# Patient Record
Sex: MALE | Race: WHITE
[De-identification: names, ages, dates, MRNs, and addresses within clinical notes are randomized per-mention and may not be internally consistent; named-entity substitution may affect disease eponyms.]

---

## 2017-01-19 ENCOUNTER — HOSPITAL ENCOUNTER (OUTPATIENT)
Dept: HOSPITAL 80 - BHCLAF | Age: 57
End: 2017-01-19
Attending: INTERNAL MEDICINE
Payer: COMMERCIAL

## 2017-01-19 DIAGNOSIS — E78.5: ICD-10-CM

## 2017-01-19 DIAGNOSIS — Q23.1: Primary | ICD-10-CM

## 2017-01-23 ENCOUNTER — HOSPITAL ENCOUNTER (OUTPATIENT)
Dept: HOSPITAL 80 - BHCLAF | Age: 57
End: 2017-01-23
Attending: INTERNAL MEDICINE
Payer: COMMERCIAL

## 2017-01-23 DIAGNOSIS — I35.9: Primary | ICD-10-CM

## 2017-01-23 DIAGNOSIS — I71.9: ICD-10-CM

## 2017-01-25 ENCOUNTER — HOSPITAL ENCOUNTER (OUTPATIENT)
Dept: HOSPITAL 80 - CIMAGING | Age: 57
End: 2017-01-25
Attending: THORACIC SURGERY (CARDIOTHORACIC VASCULAR SURGERY)
Payer: COMMERCIAL

## 2017-01-25 DIAGNOSIS — Q25.1: Primary | ICD-10-CM

## 2017-01-25 DIAGNOSIS — Z09: ICD-10-CM

## 2017-01-25 DIAGNOSIS — K76.89: ICD-10-CM

## 2017-01-25 DIAGNOSIS — Z86.79: ICD-10-CM

## 2017-01-25 DIAGNOSIS — J98.4: ICD-10-CM

## 2017-01-25 DIAGNOSIS — Q23.1: ICD-10-CM

## 2017-01-25 NOTE — CT
CT Chest Without Contrast   1111 hours



Indication:   Follow-up thoracic aortic aneurysm, congenital insufficiency of the aortic valve, coarc
tation of the aorta. (I 71.2, Q 23.1, Q 25.1).



Technique:    Spiral images were obtained through the chest.  Images were reviewed in multiple planes
. Dose reduction techniques were utilized. Volume rendering was attempted.



Comparison: Prior CT chest study from June 14, 2010..



Findings:    



Lung and large airways: Numerous small less than 3 mm calcifications are seen at the lung bases simil
ar to the prior study. This probably post infectious or related to underlying pneumoconiosis. There a
re no significant new pulmonary nodules identified. There is no consolidation or effusion..

Bronchi: No significant bronchial wall thickening 

Pleura:  Normal.

Vessels: There is once again underlying coarctation of the descending thoracic aorta with associated 
marginal arteriosclerotic calcifications. The left subclavian artery takeoff is just above the coarct
ation. There is moderate dilatation at the aortic root level measuring about 5.7 cm transversely by 4
.9 cm in the AP direction. Consider possibility of underlying bicuspid aortic valve..

Heart and pericardium:  Normal.

Mediastinum and aicha: No mass or lymphadenopathy.

Chest wall and lower neck:  Normal.

Limited upper abdomen: There are a few small subcentimeter low densities in the liver probably repres
enting small incidental cysts. No significant abnormalities visualized in the upper abdomen.



Skeletal system: Vertebral body heights are well-maintained. There are no lytic or sclerotic osseous 
lesions.



Impression:  

1. Relatively stable appearance of the thoracic coarctation involving the descending portion of the a
ortic arch just distal to the takeoff of the left subclavian artery as detailed above.

2. Moderate dilatation of the aortic root. Questionable bicuspid aortic valve.

3. Numerous small less than 3 mm calcifications at the lung bases similar to the prior study probably
 representing postinfectious etiology or pneumoconiosis.

4. Incidental subcentimeter probable cysts within the liver..

## 2017-02-22 ENCOUNTER — HOSPITAL ENCOUNTER (INPATIENT)
Dept: HOSPITAL 80 - F3N | Age: 57
LOS: 7 days | Discharge: HOME | DRG: 217 | End: 2017-03-01
Attending: INTERNAL MEDICINE | Admitting: THORACIC SURGERY (CARDIOTHORACIC VASCULAR SURGERY)
Payer: COMMERCIAL

## 2017-02-22 DIAGNOSIS — Q23.1: Primary | ICD-10-CM

## 2017-02-22 DIAGNOSIS — I25.10: ICD-10-CM

## 2017-02-22 DIAGNOSIS — I77.810: ICD-10-CM

## 2017-02-22 DIAGNOSIS — Q25.43: ICD-10-CM

## 2017-02-22 DIAGNOSIS — I48.1: ICD-10-CM

## 2017-02-22 DIAGNOSIS — J95.812: ICD-10-CM

## 2017-02-22 DIAGNOSIS — D62: ICD-10-CM

## 2017-02-22 DIAGNOSIS — Q25.1: ICD-10-CM

## 2017-02-22 DIAGNOSIS — E78.5: ICD-10-CM

## 2017-02-22 DIAGNOSIS — E66.9: ICD-10-CM

## 2017-02-22 DIAGNOSIS — I10: ICD-10-CM

## 2017-02-22 LAB
% IMMATURE GRANULYOCYTES: 0.5 % (ref 0–1.1)
ABSOLUTE IMMATURE GRANULOCYTES: 0.04 10^3/UL (ref 0–0.1)
ABSOLUTE NRBC COUNT: 0 10^3/UL (ref 0–0.01)
ADD DIFF?: NO
ADD MORPH?: NO
ADD SCAN?: NO
ANION GAP SERPL CALC-SCNC: 11 MEQ/L (ref 8–16)
ATYPICAL LYMPHOCYTE FLAG: 0 (ref 0–99)
CALCIUM SERPL-MCNC: 9.1 MG/DL (ref 8.5–10.4)
CHLORIDE SERPL-SCNC: 110 MEQ/L (ref 97–110)
CHOLEST SERPL-MCNC: 152 MG/DL (ref 140–220)
CHOLEST/HDLC SERPL: 4.47 RATIO (ref 1–4.97)
CO2 SERPL-SCNC: 23 MEQ/L (ref 22–31)
CREAT SERPL-MCNC: 0.8 MG/DL (ref 0.7–1.3)
ERYTHROCYTE [DISTWIDTH] IN BLOOD BY AUTOMATED COUNT: 13 % (ref 11.5–15.2)
EST. AVERAGE GLUCOSE BLD GHB EST-MCNC: 114 MG/DL (ref 68–126)
FRAGMENT RBC FLAG: 0 (ref 0–99)
GFR SERPL CREATININE-BSD FRML MDRD: > 60 ML/MIN/{1.73_M2}
GLUCOSE SERPL-MCNC: 87 MG/DL (ref 70–100)
HBA1C MFR BLD: 5.6 % (ref 4–6)
HCT VFR BLD CALC: 47.7 % (ref 40–51)
HGB BLD-MCNC: 16.4 G/DL (ref 13.7–17.5)
HIGH DENSITY LIPOPROTEIN: 34 MG/DL (ref 40–65)
INR PPP: 1.11 (ref 0.83–1.16)
LDLC/HDLC SERPL: 2.68 RATIO (ref 1–3.64)
LEFT SHIFT FLG: 0 (ref 0–99)
LIPEMIA HEMOLYSIS FLAG: 90 (ref 0–99)
LOW DENSITY LIPOPROTEIN: 91 MG/DL (ref 80–100)
MAGNESIUM SERPL-MCNC: 2.1 MG/DL (ref 1.6–2.3)
MCH RBC BLDCO QN: 31.4 PG (ref 27.9–34.1)
MCHC RBC AUTO-ENTMCNC: 34.4 G/DL (ref 32.4–36.7)
MCV RBC AUTO: 91.4 FL (ref 81.5–99.8)
NON-HIGH DENSITY LIPOPROTEIN: 118 MG/DL (ref 90–129)
NRBC-AUTO%: 0 % (ref 0–0.2)
PLATELET # BLD: 199 10^3/UL (ref 150–400)
PLATELET CLUMPS FLAG: 0 (ref 0–99)
PMV BLD AUTO: 9.1 FL (ref 8.7–11.7)
POTASSIUM SERPL-SCNC: 4 MEQ/L (ref 3.5–5.2)
PROTHROMBIN TIME: 14.2 SEC (ref 12–15)
RBC # BLD AUTO: 5.22 10^6/UL (ref 4.4–6.38)
SODIUM SERPL-SCNC: 144 MEQ/L (ref 134–144)
TRIGL SERPL-MCNC: 136 MG/DL (ref 40–150)
VERY LOW DENSITY LIPOPROTEINS: 27 MG/DL (ref 8–25)

## 2017-02-22 PROCEDURE — G0008 ADMIN INFLUENZA VIRUS VAC: HCPCS

## 2017-02-22 PROCEDURE — 4A023N7 MEASUREMENT OF CARDIAC SAMPLING AND PRESSURE, LEFT HEART, PERCUTANEOUS APPROACH: ICD-10-PCS | Performed by: INTERNAL MEDICINE

## 2017-02-22 PROCEDURE — P9041 ALBUMIN (HUMAN),5%, 50ML: HCPCS

## 2017-02-22 PROCEDURE — C1768 GRAFT, VASCULAR: HCPCS

## 2017-02-22 PROCEDURE — C1769 GUIDE WIRE: HCPCS

## 2017-02-22 RX ADMIN — MUPIROCIN SCH APP: 20 OINTMENT TOPICAL at 21:00

## 2017-02-22 NOTE — CPEKG
Heart Rate: 64

RR Interval: 938

P-R Interval: 180

QRSD Interval: 126

QT Interval: 420

QTC Interval: 434

P Axis: 30

QRS Axis: -41

T Wave Axis: 79

EKG Severity - ABNORMAL ECG -

EKG Impression: SINUS RHYTHM

EKG Impression: LEFT BUNDLE BRANCH BLOCK

Electronically Signed By: Shane Connell 22-Feb-2017 12:58:44

## 2017-02-22 NOTE — PDDXCAT
Diagnostic Cath Note





- .


Date: 02/22/17


: Yadiel


Indication: other (Aortic stenosis/regurgitation and thoracic aortic aneurysm)





- Procedure


Access: right wrist


Procedure: other (Sightseer and JR-4)





- Materials


Left Heart Cath size: 5F





- Findings-Left Heart Catheterization


LM: Normal.


LAD: Normal.


LCX: Large, dominant, and normal.


RCA: Small, non-dominant, and normal.


LVEF: No LV-gram.


Complications: None


Estimated blood loss: <50ml


Closure method: TR Band


Assessment: Angiographically normal, left-dominant coronary system.

## 2017-02-23 LAB
ANION GAP SERPL CALC-SCNC: 6 MEQ/L (ref 8–16)
CALCIUM SERPL-MCNC: 8.8 MG/DL (ref 8.5–10.4)
CALCULATED OXYGEN SATURATION: 95 % (ref 92–95)
CHLORIDE SERPL-SCNC: 110 MEQ/L (ref 97–110)
CO2 SERPL-SCNC: 24 MEQ/L (ref 22–31)
CREAT SERPL-MCNC: 0.8 MG/DL (ref 0.7–1.3)
GFR SERPL CREATININE-BSD FRML MDRD: > 60 ML/MIN/{1.73_M2}
GLUCOSE SERPL-MCNC: 87 MG/DL (ref 70–100)
POTASSIUM SERPL-SCNC: 4.3 MEQ/L (ref 3.5–5.2)
SAMPLE TYPE: (no result)
SODIUM SERPL-SCNC: 140 MEQ/L (ref 134–144)

## 2017-02-23 PROCEDURE — 02RX08Z REPLACEMENT OF THORACIC AORTA, ASCENDING/ARCH WITH ZOOPLASTIC TISSUE, OPEN APPROACH: ICD-10-PCS | Performed by: THORACIC SURGERY (CARDIOTHORACIC VASCULAR SURGERY)

## 2017-02-23 PROCEDURE — 5A1221Z PERFORMANCE OF CARDIAC OUTPUT, CONTINUOUS: ICD-10-PCS | Performed by: THORACIC SURGERY (CARDIOTHORACIC VASCULAR SURGERY)

## 2017-02-23 PROCEDURE — 02RF08Z REPLACEMENT OF AORTIC VALVE WITH ZOOPLASTIC TISSUE, OPEN APPROACH: ICD-10-PCS | Performed by: THORACIC SURGERY (CARDIOTHORACIC VASCULAR SURGERY)

## 2017-02-23 RX ADMIN — KETOROLAC TROMETHAMINE SCH MG: 15 INJECTION, SOLUTION INTRAMUSCULAR; INTRAVENOUS at 13:39

## 2017-02-23 RX ADMIN — POTASSIUM CHLORIDE PRN MLS: 400 INJECTION, SOLUTION INTRAVENOUS at 13:39

## 2017-02-23 RX ADMIN — POTASSIUM CHLORIDE PRN MLS: 400 INJECTION, SOLUTION INTRAVENOUS at 14:28

## 2017-02-23 RX ADMIN — KETOROLAC TROMETHAMINE SCH MG: 15 INJECTION, SOLUTION INTRAMUSCULAR; INTRAVENOUS at 18:03

## 2017-02-23 RX ADMIN — CEFAZOLIN SCH MLS: 1 INJECTION, POWDER, FOR SOLUTION INTRAMUSCULAR; INTRAVENOUS at 22:13

## 2017-02-23 RX ADMIN — CEFAZOLIN SCH MLS: 1 INJECTION, POWDER, FOR SOLUTION INTRAMUSCULAR; INTRAVENOUS at 14:23

## 2017-02-23 RX ADMIN — MUPIROCIN SCH: 20 OINTMENT TOPICAL at 13:33

## 2017-02-23 RX ADMIN — MUPIROCIN SCH APP: 20 OINTMENT TOPICAL at 21:13

## 2017-02-23 NOTE — GOP
DATE OF OPERATION:  02/23/2017



SURGEON:  Alonso Lyons DO



ASSISTANT:  Zion Collazo PA-C.



ANESTHESIA:  IV.  Anurag Oconnor MD.



PREOPERATIVE DIAGNOSIS:  Bicuspid aortic valve with aortic stenosis, aortic insufficiency, and aorti
c root aneurysm.



POSTOPERATIVE DIAGNOSIS:  Bicuspid aortic valve with aortic stenosis, aortic insufficiency, and aort
ic root aneurysm.



PROCEDURE PERFORMED:  

1.  Aortic root replacement with a #25 Freestyle bioprosthesis.

2.  Replaced ascending aorta with prosthetic material from the bioprosthesis.



FINDINGS:  Patient was noted to have a 5 cm aortic root aneurysm with bicuspid aortic valve, and a h
istory of previous coarctation.  He was at increased risk for dissection because of his pathology an
d was offered elective surgery.  He consented.



DESCRIPTION OF PROCEDURE:  He was brought to the operating room and intubated and monitoring lines w
ere placed.  He was prepped and draped in sterile classical manner.  Transesophageal echo revealed m
oderate AI with moderate AS, with obvious root aneurysm.  Coronaries were normal.  A sternotomy was 
performed.  He was heparinized and cannulated in the transverse arch and right atrium.  Retrograde a
nd antegrade cardioplegia catheters were placed.  Cardiopulmonary bypass was begun.  The aorta was c
ross clamped where it was of normal size at 2.5 cm at the innominate artery, and antegrade followed 
by retrograde cardioplegia were administered.  We placed an LV sump through the right superior pulmo
nary vein.  Topical hypothermia and systemic cooling were also utilized.  We then excised the ascend
ing aorta and the entire aortic root, which had a classic marfanoid characteristic to it with very t
hin friable sinuses, which were markedly dilated.  Both coronary ostia were markedly enlarged; they 
were excised as buttons.  The valve was excised.  Initially considered trying to repair; however, it
 was somewhat fibrosed and retracted at the margins, and I felt that he would just have too much aor
tic insufficiency despite repair.  This was a true bicuspid with coronaries 180 degrees apposed.  Th
e valve was excised.  Interrupted 2-0 Tycron sutures were placed.  He was sized for a 25 Freestyle v
alve, which was secured in place with Cor-Knot's with a piece of felt buttressing between the suture
s.  We then measured and excised the pigs non coronary sinus; the valve had been rotated 120 degrees
 in order to make the non coronary sinus the left coronary sinus of the conduit.  The left main was 
sutured in place with a continuous running 5-0 Prolene reinforced with BioGlue.  We then performed a
 similar button on the pig's left coronary sinus which became the right and sewed that with a contin
uous running 5-0 Prolene without tension, traction, or torsion.  Rewarming was begun while the end-t
o-end anastomosis was completed without tension.  CO2 had been infused throughout the procedure.  Th
e cross-clamp was removed with suction on the ascending aortic vent in Trendelenburg.  All air was a
spirated through the LV apex and ascending aortic vent, as well as intermittent aspiration of the LV
 apex with the sump off.  When no air was identified, he was weaned from bypass in Trendelenburg.  T
he heparin was reversed with protamine.  The cannula was removed and oversewn.  Two ventricular paci
ng wires and 2 mediastinal and 1 right pleural tubes were placed.  The thymic fat and pericardium we
re closed.  Chest was closed in a standard fashion.  Patient was returned to ICU in stable condition
.





Job #:  [id number]

## 2017-02-23 NOTE — POSTOPPROG
Post Op Note


Date of Operation: 02/23/17


Surgeon: Alonso Lyons


Assistant: Vale


Anesthesiologist: Lester


Anesthesia: GET(General Endotracheal)


Post-op Diagnosis: aortic root aneurysm, AS/AI


Procedure: aortic root #25 Freestyle, asc repalcement w conduit


Inf/Abcess present in the surg proc area at time of surgery?: No


EBL: 100-500


Complications: 





none


Drains: Other (3 blakes)

## 2017-02-23 NOTE — GCON
CRITICAL CARE CONSULT.



DATE OF CONSULTATION:  02/23/2017





HISTORY OF PRESENT ILLNESS:  The patient is a 56-year-old male with a history of aortic coarctation,
 who has had a repair in the distant past but developed aortic root dilatation with some aortic insu
fficiency.  He was taken to the operating room today for a repair by Dr. Lyons, which was without co
mplication.  He was returned to the ICU following surgery on a ventilator, but is weaning quite well
 and we anticipate extubation in the very near future.  His blood pressure was a little bit soft on 
a Cardene drip, which is currently being held.  Otherwise, his urine output is adequate, and the pat
ient is awake and cooperative.



REVIEW OF SYSTEMS:  Otherwise negative.



PAST MEDICAL HISTORY:  Includes the aortic coarctation, aortic dilatation, aortic insufficiency, hyp
ertension, cardiomyopathy and hyperlipidemia.



PAST SURGICAL HISTORY:  Includes a previous coarctation repair as well as this repair.



SOCIAL HISTORY:  He is a nonsmoker.  No alcohol or IV drug use.



FAMILY HISTORY:  Includes coronary disease and hypertension.



MEDICATIONS:  At this time, include Tylenol, aspirin, Lipitor, Dulcolax, Ancef, fentanyl, insulin, T
oradol, Reglan, Zofran, Protonix, senna.



PHYSICAL EXAMINATION:  VITAL SIGNS:  His blood pressure is currently 92/62 with a mean of 72, respir
atory rate is 14, heart rate 85, in sinus rhythm.  Oxygen saturation is 100% on minimal ventilator s
upport.  GENERAL:  He is an overweight male who was in no apparent distress.  Was somnolent but able
 to follow commands quite easily.  HEENT:  Pupils equally round, reactive to light, nonicteric and n
oninjected.  NECK:  Supple without adenopathy.  Breath sounds clear to auscultation.  HEART:  Regula
r rate and rhythm without obvious murmur.  CHEST:  His incision was clean and dry without evidence o
f bleeding.  Chest tube insertion sites were also clean and dry without bleeding or hematomas.  ABDO
MEN:  Soft, nontender, nondistended with hypoactive bowel tones.  EXTREMITIES:  No clubbing, cyanosi
s, or edema.  NEUROLOGICAL:  Cursory neurological exam is nonfocal.



OBJECTIVE DATA:  Includes basic metabolic panel, done earlier today, which was normal.  A chest x-ra
y showed lines and tube in the correct positions and minor atelectasis.



ASSESSMENT/PLAN:  

1.  Status post aortic root repair without major complications.  This seems to be hemodynamically st
able with a somewhat low blood pressure.  We are simply holding his Cardene drip at this time.  I do
 not believe any blood pressure support is required, but this may change in the very near future, de
pending on how well he responds.  He may need a NICOM to look for fluid responsiveness, and a CBC to
 be certain that no blood products are required.

2.  Respiratory failure with hypoxemia.  Seems to be doing well from a ventilator perspective and is
 likely to wean according to protocol.

3.  Hypertension.  As above, we are holding his medications at this time.





Job #:  [id number]

## 2017-02-24 LAB
% IMMATURE GRANULYOCYTES: 0.5 % (ref 0–1.1)
ABSOLUTE IMMATURE GRANULOCYTES: 0.07 10^3/UL (ref 0–0.1)
ABSOLUTE NRBC COUNT: 0 10^3/UL (ref 0–0.01)
ADD DIFF?: NO
ADD MORPH?: NO
ADD SCAN?: NO
ANION GAP SERPL CALC-SCNC: 8 MEQ/L (ref 8–16)
ATYPICAL LYMPHOCYTE FLAG: 0 (ref 0–99)
CALCIUM SERPL-MCNC: 8.2 MG/DL (ref 8.5–10.4)
CHLORIDE SERPL-SCNC: 114 MEQ/L (ref 97–110)
CO2 SERPL-SCNC: 22 MEQ/L (ref 22–31)
CREAT SERPL-MCNC: 0.7 MG/DL (ref 0.7–1.3)
ERYTHROCYTE [DISTWIDTH] IN BLOOD BY AUTOMATED COUNT: 13.2 % (ref 11.5–15.2)
FRAGMENT RBC FLAG: 0 (ref 0–99)
GFR SERPL CREATININE-BSD FRML MDRD: > 60 ML/MIN/{1.73_M2}
GLUCOSE SERPL-MCNC: 108 MG/DL (ref 70–100)
HCT VFR BLD CALC: 31.9 % (ref 40–51)
HGB BLD-MCNC: 10.9 G/DL (ref 13.7–17.5)
LEFT SHIFT FLG: 10 (ref 0–99)
LIPEMIA HEMOLYSIS FLAG: 90 (ref 0–99)
MCH RBC BLDCO QN: 31.7 PG (ref 27.9–34.1)
MCHC RBC AUTO-ENTMCNC: 34.2 G/DL (ref 32.4–36.7)
MCV RBC AUTO: 92.7 FL (ref 81.5–99.8)
NRBC-AUTO%: 0 % (ref 0–0.2)
PLATELET # BLD: 102 10^3/UL (ref 150–400)
PLATELET CLUMPS FLAG: 10 (ref 0–99)
PMV BLD AUTO: 9.7 FL (ref 8.7–11.7)
POTASSIUM SERPL-SCNC: 4.3 MEQ/L (ref 3.5–5.2)
RBC # BLD AUTO: 3.44 10^6/UL (ref 4.4–6.38)
SODIUM SERPL-SCNC: 144 MEQ/L (ref 134–144)

## 2017-02-24 RX ADMIN — HEPARIN SODIUM SCH UNIT: 5000 INJECTION, SOLUTION INTRAVENOUS; SUBCUTANEOUS at 05:50

## 2017-02-24 RX ADMIN — KETOROLAC TROMETHAMINE SCH MG: 15 INJECTION, SOLUTION INTRAMUSCULAR; INTRAVENOUS at 05:49

## 2017-02-24 RX ADMIN — CEFAZOLIN SCH MLS: 1 INJECTION, POWDER, FOR SOLUTION INTRAMUSCULAR; INTRAVENOUS at 23:21

## 2017-02-24 RX ADMIN — KETOROLAC TROMETHAMINE SCH MG: 15 INJECTION, SOLUTION INTRAMUSCULAR; INTRAVENOUS at 00:07

## 2017-02-24 RX ADMIN — CEFAZOLIN SCH MLS: 1 INJECTION, POWDER, FOR SOLUTION INTRAMUSCULAR; INTRAVENOUS at 14:33

## 2017-02-24 RX ADMIN — HEPARIN SODIUM SCH UNIT: 5000 INJECTION, SOLUTION INTRAVENOUS; SUBCUTANEOUS at 22:44

## 2017-02-24 RX ADMIN — METOPROLOL TARTRATE SCH MG: 25 TABLET, FILM COATED ORAL at 09:32

## 2017-02-24 RX ADMIN — KETOROLAC TROMETHAMINE SCH MG: 15 INJECTION, SOLUTION INTRAMUSCULAR; INTRAVENOUS at 18:00

## 2017-02-24 RX ADMIN — PANTOPRAZOLE SODIUM SCH MG: 40 TABLET, DELAYED RELEASE ORAL at 09:32

## 2017-02-24 RX ADMIN — HEPARIN SODIUM SCH UNIT: 5000 INJECTION, SOLUTION INTRAVENOUS; SUBCUTANEOUS at 14:33

## 2017-02-24 RX ADMIN — CEFAZOLIN SCH MLS: 1 INJECTION, POWDER, FOR SOLUTION INTRAMUSCULAR; INTRAVENOUS at 05:48

## 2017-02-24 RX ADMIN — ASPIRIN 81 MG SCH MG: 81 TABLET ORAL at 09:32

## 2017-02-24 RX ADMIN — METOPROLOL TARTRATE SCH MG: 25 TABLET, FILM COATED ORAL at 20:20

## 2017-02-24 RX ADMIN — MUPIROCIN SCH: 20 OINTMENT TOPICAL at 23:32

## 2017-02-24 RX ADMIN — MUPIROCIN SCH APP: 20 OINTMENT TOPICAL at 09:32

## 2017-02-24 RX ADMIN — DOCUSATE SODIUM AND SENNOSIDES SCH TAB: 50; 8.6 TABLET ORAL at 20:20

## 2017-02-24 RX ADMIN — KETOROLAC TROMETHAMINE SCH MG: 15 INJECTION, SOLUTION INTRAMUSCULAR; INTRAVENOUS at 12:00

## 2017-02-24 RX ADMIN — MUPIROCIN SCH APP: 20 OINTMENT TOPICAL at 23:22

## 2017-02-24 NOTE — SOAPPROG
SOAP Progress Note


Assessment/Plan: 





POD #1: Aortic root and partial ascending aortic replacement with #25 freestyle 

bioprosthetic porcine root 





BAV, dilated aortic root/ascending aorta, and severe AI with preserved LV s/p 

aortic root and partial ascending aortic replacement with #25 freestyle 

bioprosthetic graft


- AL/FC out 


- CT to remain on H2O seal d/t small air leak 


- Low dose BB for tachycardia / Lasix when BB improves  


- Transfer to PCU 





Acute blood loss anemia - stable





h/o aortic coarctation s/p repair as a child - stable  





02/24/17 10:13





Subjective: 





Feels well. No complaints.


Objective: 





 Vital Signs











Temp Pulse Resp BP Pulse Ox


 


 37.7 C   104 H  20   116/70   93 


 


 02/24/17 06:00  02/24/17 06:00  02/24/17 06:00  02/24/17 06:00  02/24/17 06:00








 Laboratory Results





 02/24/17 03:58 





 02/24/17 03:58 





 











 02/23/17 02/24/17 02/25/17





 05:59 05:59 05:59


 


Intake Total 1000 1698 


 


Output Total 750 2433 


 


Balance 250 -735 








 











PT  14.2 SEC (12.0-15.0)   02/22/17  11:50    


 


INR  1.11  (0.83-1.16)   02/22/17  11:50    














Physical Exam





- Physical Exam


General Appearance: WD/WN, alert, no apparent distress


EENT: No scleral icterus (R), No scleral icterus (L)


Neck: normal inspection


Respiratory: chest non-tender, lungs clear, normal breath sounds, No 

respiratory distress


Cardiac/Chest: tachycardia


Abdomen: normal bowel sounds, non-tender, soft


Skin: normal color, warm/dry


Extremities: pedal edema (+1 B/L)


Neuro/Psych: no motor/sensory deficits, alert, normal mood/affect, oriented x 3





ICD10 Worksheet


Patient Problems: 


 Problems











Problem Status Onset


 


Acute blood loss anemia Acute  


 


Coronary artery disease excluded Acute  ~02/22/17


 


S/P aortic valve replacement with stentless valve Acute  ~02/23/17


 


Aortic root aneurysm Chronic  


 


Aortic valve stenosis with insufficiency Chronic  


 


Bicuspid aortic valve Chronic

## 2017-02-25 LAB
% IMMATURE GRANULYOCYTES: 0.3 % (ref 0–1.1)
ABSOLUTE IMMATURE GRANULOCYTES: 0.04 10^3/UL (ref 0–0.1)
ABSOLUTE NRBC COUNT: 0 10^3/UL (ref 0–0.01)
ADD DIFF?: NO
ADD MORPH?: NO
ADD SCAN?: NO
ANION GAP SERPL CALC-SCNC: 7 MEQ/L (ref 8–16)
ATYPICAL LYMPHOCYTE FLAG: 0 (ref 0–99)
CALCIUM SERPL-MCNC: 8.2 MG/DL (ref 8.5–10.4)
CHLORIDE SERPL-SCNC: 111 MEQ/L (ref 97–110)
CO2 SERPL-SCNC: 25 MEQ/L (ref 22–31)
CREAT SERPL-MCNC: 0.9 MG/DL (ref 0.7–1.3)
ERYTHROCYTE [DISTWIDTH] IN BLOOD BY AUTOMATED COUNT: 13.8 % (ref 11.5–15.2)
FRAGMENT RBC FLAG: 0 (ref 0–99)
GFR SERPL CREATININE-BSD FRML MDRD: > 60 ML/MIN/{1.73_M2}
GLUCOSE SERPL-MCNC: 116 MG/DL (ref 70–100)
HCT VFR BLD CALC: 29.7 % (ref 40–51)
HGB BLD-MCNC: 10 G/DL (ref 13.7–17.5)
LEFT SHIFT FLG: 0 (ref 0–99)
LIPEMIA HEMOLYSIS FLAG: 80 (ref 0–99)
MCH RBC BLDCO QN: 31.8 PG (ref 27.9–34.1)
MCHC RBC AUTO-ENTMCNC: 33.7 G/DL (ref 32.4–36.7)
MCV RBC AUTO: 94.6 FL (ref 81.5–99.8)
NRBC-AUTO%: 0 % (ref 0–0.2)
PLATELET # BLD: 95 10^3/UL (ref 150–400)
PLATELET CLUMPS FLAG: 0 (ref 0–99)
PMV BLD AUTO: 10 FL (ref 8.7–11.7)
POTASSIUM SERPL-SCNC: 3.8 MEQ/L (ref 3.5–5.2)
RBC # BLD AUTO: 3.14 10^6/UL (ref 4.4–6.38)
SODIUM SERPL-SCNC: 143 MEQ/L (ref 134–144)

## 2017-02-25 RX ADMIN — METOPROLOL TARTRATE SCH MG: 25 TABLET, FILM COATED ORAL at 21:42

## 2017-02-25 RX ADMIN — DOCUSATE SODIUM AND SENNOSIDES SCH TAB: 50; 8.6 TABLET ORAL at 09:19

## 2017-02-25 RX ADMIN — HEPARIN SODIUM SCH UNIT: 5000 INJECTION, SOLUTION INTRAVENOUS; SUBCUTANEOUS at 06:46

## 2017-02-25 RX ADMIN — ATORVASTATIN CALCIUM SCH MG: 20 TABLET, FILM COATED ORAL at 21:42

## 2017-02-25 RX ADMIN — ASPIRIN 81 MG SCH MG: 81 TABLET ORAL at 09:19

## 2017-02-25 RX ADMIN — PANTOPRAZOLE SODIUM SCH MG: 40 TABLET, DELAYED RELEASE ORAL at 09:20

## 2017-02-25 RX ADMIN — METOPROLOL TARTRATE SCH MG: 25 TABLET, FILM COATED ORAL at 09:19

## 2017-02-25 RX ADMIN — DOCUSATE SODIUM AND SENNOSIDES SCH TAB: 50; 8.6 TABLET ORAL at 21:42

## 2017-02-25 RX ADMIN — MUPIROCIN SCH APP: 20 OINTMENT TOPICAL at 09:21

## 2017-02-25 NOTE — SOAPPROG
SOAP Progress Note


Assessment/Plan: 


Assessment:  POD#2 AVR with root and partial ascending aortic replacement using 

a #25 freestyle porcine root 





BAV w AS/AI and dilated ascending aorta - s/p rsxn and replacement with porcine 

root. Sufficient length to reach distal aorta without dacron interposition 

graft. Antithrombotic prophylaxis with ASA alone pending stability of rhythm. 





Postoperative tachycardia - with borderline hypotension. Likely reactive to 

pain or fluid losses. No evidence bleeding or renal insuff.  Cards consulted to 

opine ST vs AT. Trial of adenosine recommended. Care with diuresis. BB for AF 

prophylaxis when appropriate.





Postoperative air leak - with valsalva. No assoc PTX. Apical pleural nick 

suspected. Chest tubes maintained to pleurovac. 





Acute expected blood loss anemia - Stable. No transfusions required. 











Plan:


Adenosine 6 mg IVP, MR x 1 prn.


Gentle hydration w IVF.


Metoprolol 12.5 mg BID w conservative hold parameters.


Keep chest tubes to pleurovac.


Keep Vwires.


Inc activity as tolerated.








02/25/17 10:13

















Subjective: 





Doing ok. Satisfactory analgesia. No nausea or dizziness. No acute c/o.


Objective: 





 Vital Signs











Temp Pulse Resp BP Pulse Ox


 


 36.4 C   133 H  18   111/66   92 


 


 02/25/17 07:00  02/25/17 08:44  02/25/17 07:00  02/25/17 07:00  02/25/17 08:44








 Laboratory Results





 02/25/17 03:40 





 02/25/17 03:40 





 











 02/24/17 02/25/17 02/26/17





 05:59 05:59 05:59


 


Intake Total 1698 400 


 


Output Total 2433 1185 


 


Balance -735 -785 








 











PT  14.2 SEC (12.0-15.0)   02/22/17  11:50    


 


INR  1.11  (0.83-1.16)   02/22/17  11:50    








Narrow complex tachycardia 100s-130s. Well tolerated. SBP > 90.


Fairly vigorous autodiuresis yest with stable lytes and renal fx.


CXR-> mild pulm vasc congestion, small rt pl eff, no overt PTX.








Physical Exam





- Physical Exam


General Appearance: alert


Respiratory: lungs clear (grossly), other (blakes x 3 y-d to pleurovac, 

serosang drainage, no air leak w nl tidal, +1 chamber leak w cough)


Cardiac/Chest: regular rate, rhythm, tachycardia, other (Sternum grossly 

stable. Sternotomy CDI)


Abdomen: normal bowel sounds, non-tender, soft


Skin: warm/dry


Extremities: other (no visible edema)





ICD10 Worksheet


Patient Problems: 


 Problems











Problem Status Onset


 


Acute blood loss anemia Acute  


 


Coronary artery disease excluded Acute  ~02/22/17


 


S/P aortic valve replacement with stentless valve Acute  ~02/23/17


 


S/P ascending aortic replacement Acute  ~02/23/17


 


Aortic root aneurysm Chronic  


 


Aortic valve stenosis with insufficiency Chronic  


 


Bicuspid aortic valve Chronic

## 2017-02-25 NOTE — CPEKG
Heart Rate: 116

RR Interval: 517

P-R Interval: 168

QRSD Interval: 120

QT Interval: 340

QTC Interval: 473

P Axis: 41

QRS Axis: 0

T Wave Axis: 87

EKG Severity - ABNORMAL ECG -

EKG Impression: SINUS TACHYCARDIA

EKG Impression: NONSPECIFIC INTRAVENTRICULAR CONDUCTION DELAY

EKG Impression: BORDERLINE R WAVE PROGRESSION, ANTERIOR LEADS

Electronically Signed By: Shane Connell 25-Feb-2017 12:53:38

## 2017-02-26 LAB
ANION GAP SERPL CALC-SCNC: 4 MEQ/L (ref 8–16)
CALCIUM SERPL-MCNC: 8 MG/DL (ref 8.5–10.4)
CHLORIDE SERPL-SCNC: 108 MEQ/L (ref 97–110)
CO2 SERPL-SCNC: 26 MEQ/L (ref 22–31)
CREAT SERPL-MCNC: 0.6 MG/DL (ref 0.7–1.3)
GFR SERPL CREATININE-BSD FRML MDRD: > 60 ML/MIN/{1.73_M2}
GLUCOSE SERPL-MCNC: 107 MG/DL (ref 70–100)
POTASSIUM SERPL-SCNC: 4.1 MEQ/L (ref 3.5–5.2)
SODIUM SERPL-SCNC: 138 MEQ/L (ref 134–144)

## 2017-02-26 RX ADMIN — METOPROLOL TARTRATE SCH MG: 25 TABLET, FILM COATED ORAL at 08:25

## 2017-02-26 RX ADMIN — ATORVASTATIN CALCIUM SCH MG: 20 TABLET, FILM COATED ORAL at 21:51

## 2017-02-26 RX ADMIN — METOPROLOL TARTRATE SCH MG: 25 TABLET, FILM COATED ORAL at 21:52

## 2017-02-26 RX ADMIN — PANTOPRAZOLE SODIUM SCH MG: 40 TABLET, DELAYED RELEASE ORAL at 08:28

## 2017-02-26 RX ADMIN — DOCUSATE SODIUM AND SENNOSIDES SCH: 50; 8.6 TABLET ORAL at 21:53

## 2017-02-26 RX ADMIN — ASPIRIN 81 MG SCH MG: 81 TABLET ORAL at 08:25

## 2017-02-26 RX ADMIN — DOCUSATE SODIUM AND SENNOSIDES SCH TAB: 50; 8.6 TABLET ORAL at 08:26

## 2017-02-26 NOTE — SOAPPROG
SOAP Progress Note


Assessment/Plan: 


Assessment:  POD#3 AVR with root and partial ascending aortic replacement using 

a #25 freestyle porcine root 





BAV w AS/AI and dilated ascending aorta - s/p rsxn and replacement with porcine 

root. Sufficient length to reach distal aorta without dacron interposition 

graft. Antithrombotic prophylaxis with ASA alone pending stability of rhythm. 





Postoperative tachycardia - with borderline hypotension. Likely reactive to 

pain or fluid losses. No evidence bleeding or renal insuff.  Cards consulted to 

opine ST vs AT. Trial of adenosine recommended. Care with diuresis. BB for AF 

prophylaxis when appropriate.





Postoperative air leak - with valsalva. No assoc PTX. Apical pleural nick 

suspected. Chest tubes maintained to pleurovac with resolution of leak.





Acute expected blood loss anemia - Stable. No transfusions required. 











Plan:


Inc Metoprolol to 25mg BID.


Blakes to bulb suction.


Vwire removed.


Inc activity as tolerated.


Baseline postop echo tomorrow.











02/26/17 09:01














Subjective: 





Feels fine. Eating well. Improving stamina. Satisfactory analgesia. No acute 

concerns. 


Objective: 





 Vital Signs











Temp Pulse Resp BP Pulse Ox


 


 36.7 C   109 H  29 H  106/76   96 


 


 02/26/17 12:21  02/26/17 12:21  02/26/17 12:21  02/26/17 12:21  02/26/17 12:21








 Laboratory Results





 02/26/17 05:40 





 02/26/17 05:40 





 











 02/25/17 02/26/17 02/27/17





 05:59 05:59 05:59


 


Intake Total 400 2238 520


 


Output Total 1185 1370 100


 


Balance -785 868 420








 











PT  14.2 SEC (12.0-15.0)   02/22/17  11:50    


 


INR  1.11  (0.83-1.16)   02/22/17  11:50    








Persistent ST/AT. Adequate BP for BB.


Decr suppl O2 req.


CXR-> No PTX, bibasilar atelectasis.


Balanced I/OS.


Stable labs.





Physical Exam





- Physical Exam


General Appearance: alert, no apparent distress


Respiratory: lungs clear, other (blakes x 3 y-d to pleurovac, serosang drainage

, no AL w cough.)


Cardiac/Chest: regular rate, rhythm, tachycardia, other (Vwires intact)


Abdomen: non-tender, soft


Skin: warm/dry


Extremities: swelling (trace dependent)





ICD10 Worksheet


Patient Problems: 


 Problems











Problem Status Onset


 


Acute blood loss anemia Acute  


 


Coronary artery disease excluded Acute  ~02/22/17


 


S/P aortic valve replacement with stentless valve Acute  ~02/23/17


 


S/P ascending aortic replacement Acute  ~02/23/17


 


Aortic root aneurysm Chronic  


 


Aortic valve stenosis with insufficiency Chronic  


 


Bicuspid aortic valve Chronic

## 2017-02-27 LAB
ERYTHROCYTE [DISTWIDTH] IN BLOOD BY AUTOMATED COUNT: 13.2 % (ref 11.5–15.2)
HCT VFR BLD CALC: 29 % (ref 40–51)
HGB BLD-MCNC: 9.7 G/DL (ref 13.7–17.5)
LIPEMIA HEMOLYSIS FLAG: 80 (ref 0–99)
MCH RBC BLDCO QN: 31.6 PG (ref 27.9–34.1)
MCHC RBC AUTO-ENTMCNC: 33.4 G/DL (ref 32.4–36.7)
MCV RBC AUTO: 94.5 FL (ref 81.5–99.8)
PLATELET # BLD: 110 10^3/UL (ref 150–400)
PLATELET CLUMPS FLAG: 10 (ref 0–99)
POTASSIUM SERPL-SCNC: 3.9 MEQ/L (ref 3.5–5.2)
RBC # BLD AUTO: 3.07 10^6/UL (ref 4.4–6.38)

## 2017-02-27 RX ADMIN — POTASSIUM CHLORIDE SCH MEQ: 1500 TABLET, EXTENDED RELEASE ORAL at 09:30

## 2017-02-27 RX ADMIN — METOPROLOL TARTRATE SCH MG: 25 TABLET, FILM COATED ORAL at 19:53

## 2017-02-27 RX ADMIN — ATORVASTATIN CALCIUM SCH MG: 20 TABLET, FILM COATED ORAL at 19:53

## 2017-02-27 RX ADMIN — ASPIRIN 81 MG SCH MG: 81 TABLET ORAL at 09:30

## 2017-02-27 RX ADMIN — METOPROLOL TARTRATE SCH MG: 25 TABLET, FILM COATED ORAL at 09:30

## 2017-02-27 RX ADMIN — PANTOPRAZOLE SODIUM SCH MG: 40 TABLET, DELAYED RELEASE ORAL at 09:30

## 2017-02-27 RX ADMIN — DOCUSATE SODIUM AND SENNOSIDES SCH TAB: 50; 8.6 TABLET ORAL at 09:29

## 2017-02-27 RX ADMIN — FUROSEMIDE SCH MG: 20 TABLET ORAL at 09:30

## 2017-02-27 NOTE — ECHO
[id number]

[id number]



+---------------------------------------------------+      [address]

:                                                   :       [place] [address]

:                                                   :           [phone]

+---------------------------------------------------+       Fax [phone]



                       Adult Echocardiographic Report



+----------------------------------------------------------------------------

---+

:Name: LILO GRAY CStudy Date: 2017 10:06 AM                          

   :

:MRN: [id number]    Hospital Admission Number: [id number] Location: 

220:

:: 1960    Gender: Male                           Height: 73 in     

   :

:Age: 56 yrs        Race: WH                               Weight: 234 lb    

   :

:Reason For Study: S/P AVR/root #23 medtronic freestyle                      

   :

:porcine root                                              BSA: 2.3 meters2  

   :

:History: Coarc repair age 10                                                

   :

+----------------------------------------------------------------------------

---+

Doppler Measurements \T\ Calculations

MV E max zari: 111.1 cm/sec              Ao mean P.5 mmHg

MV A max zari: 86.4 cm/sec               Ao V2 mean: 116.2 cm/sec

MV E/A: 1.3                             Ao V2 VTI: 29.6 cm





Left Ventricle

The left ventricle is normal in size. There is normal left ventricular wall

thickness. EF estimate is 55-60%. Left ventricular systolic function is

normal.





Right Ventricle

The right ventricle is normal in size and function.



Atria

The left atrial size is normal. Right atrial size is normal. The interatrial

septum is intact with no evidence for an atrial septal defect.



Mitral Valve

The mitral valve is normal in structure and function. There is no evidence

of mitral valve prolapse. There is no mitral valve stenosis. There is trace

to mild mitral regurgitation.



Tricuspid Valve

The tricuspid valve is not well visualized.





Aortic Valve

There is a bioprosthetic aortic valve. AV max PG is 13mmHG. AV mean PG is

6mmHG.

AV not well visualized.



Pulmonic Valve

The pulmonic valve is not well visualized. There is no pulmonic valvular

regurgitation.



Great Vessels

The aortic root is not well visualized.



Pericardium/Pleural

There is no pericardial effusion.



Conclusion

A complete two-dimensional transthoracic echocardiogram was performed (2D,

M-mode, Doppler and color flow Doppler).

The study was technically difficult.

EF estimate is 55-60%.

Left ventricular systolic function is normal.

There is trace to mild mitral regurgitation.

There is a bioprosthetic aortic valve.

AV max PG is 13mmHG. AV mean PG is 6mmHG.

AV not well visualized.

The aortic root is not well visualized.

There is no pericardial effusion.





_____________________________________________________________________________





Final Reading Physician:

                        Harrison Meza signed on 2017 12:12 PM

Ordering Physician: Camille Neal

Performed By: Radha Miranda RDCS

## 2017-02-27 NOTE — SOAPPROG
SOAP Progress Note


Assessment/Plan: 


Assessment:  POD#4 AVR with root and partial ascending aortic replacement using 

a #25 freestyle porcine root 





BAV w AS/AI and dilated ascending aorta - s/p rsxn and replacement with porcine 

root. Sufficient length to reach distal aorta without dacron interposition 

graft. Antithrombotic prophylaxis with ASA alone pending stability of rhythm. 





Postoperative tachycardia - with borderline hypotension. Likely reactive to 

pain or fluid losses. No evidence bleeding or renal insuff.  Cards consulted to 

opine ST vs AT. Trial of adenosine able to transiently slow rate to 70s. No 

definitive change in PW. Active diuresis delayed. Slow uptitration of BB.





Postoperative air leak - Resolved. Chest tubes likely out today or tomorrow.





Acute expected blood loss anemia - Stable. No transfusions required. 











Plan:


Cont Metoprolol 25mg BID.


Begin daily diuresis.


Remove mediastinal and left pleural julia later today.


Remove rt pleural julia tomorrow.


Cont inc activity and pulm toilet.


Dispo - Anticipate home on Wed.





02/27/17 09:42














Subjective: 





Feels fine. Tolerating light activity without difficulty. Minimal incisional 

discomfort. +BM. Hopeful for home soon.


Objective: 





 Vital Signs











Temp Pulse Resp BP Pulse Ox


 


 37.0 C   104 H  20   111/75   93 


 


 02/27/17 08:05  02/27/17 08:05  02/27/17 08:05  02/27/17 08:05  02/27/17 08:05








 Laboratory Results





 02/27/17 05:00 





 02/27/17 05:00 





 











 02/26/17 02/27/17 02/28/17





 05:59 05:59 05:59


 


Intake Total 2238 1720 120


 


Output Total 1370 1830 


 


Balance 868 -110 120








 











PT  14.2 SEC (12.0-15.0)   02/22/17  11:50    


 


INR  1.11  (0.83-1.16)   02/22/17  11:50    








HR remains ST/AT 90s-100s. Metoprolol inc to 25mg BID last noc.


Echo pending.


Decr suppl O2 needs.


Improving fluid balance.


2 of 3 blakes at removal criteria. 


Plt rebound evident.





Physical Exam





- Physical Exam


General Appearance: alert, no apparent distress


Respiratory: lungs clear, other (blakes x 3 to bulb suction, thin serosang 

drainage, rt pl tube draining the most)


Cardiac/Chest: regular rate, rhythm, tachycardia, other (Sternum grossly 

stable. Sternotomy CDI)


Abdomen: non-tender, soft


Skin: warm/dry


Extremities: swelling (trace)





ICD10 Worksheet


Patient Problems: 


 Problems











Problem Status Onset


 


Acute blood loss anemia Acute  


 


Coronary artery disease excluded Acute  ~02/22/17


 


S/P aortic valve replacement with stentless valve Acute  ~02/23/17


 


S/P ascending aortic replacement Acute  ~02/23/17


 


Aortic root aneurysm Chronic  


 


Aortic valve stenosis with insufficiency Chronic  


 


Bicuspid aortic valve Chronic

## 2017-02-28 LAB — POTASSIUM SERPL-SCNC: 3.9 MEQ/L (ref 3.5–5.2)

## 2017-02-28 RX ADMIN — AMIODARONE HYDROCHLORIDE SCH MG: 200 TABLET ORAL at 20:53

## 2017-02-28 RX ADMIN — POTASSIUM CHLORIDE SCH MEQ: 1500 TABLET, EXTENDED RELEASE ORAL at 07:54

## 2017-02-28 RX ADMIN — METOPROLOL TARTRATE SCH MG: 25 TABLET, FILM COATED ORAL at 07:54

## 2017-02-28 RX ADMIN — APIXABAN SCH MG: 2.5 TABLET, FILM COATED ORAL at 20:53

## 2017-02-28 RX ADMIN — METOPROLOL TARTRATE SCH MG: 25 TABLET, FILM COATED ORAL at 20:53

## 2017-02-28 RX ADMIN — ASPIRIN 81 MG SCH MG: 81 TABLET ORAL at 07:53

## 2017-02-28 RX ADMIN — ATORVASTATIN CALCIUM SCH MG: 20 TABLET, FILM COATED ORAL at 20:53

## 2017-02-28 RX ADMIN — APIXABAN SCH MG: 2.5 TABLET, FILM COATED ORAL at 10:18

## 2017-02-28 RX ADMIN — AMIODARONE HYDROCHLORIDE SCH MG: 200 TABLET ORAL at 10:18

## 2017-02-28 RX ADMIN — FUROSEMIDE SCH MG: 20 TABLET ORAL at 07:53

## 2017-02-28 NOTE — SOAPPROG
SOAP Progress Note


Assessment/Plan: 





Assessment:  POD#5 AVR with root and partial ascending aortic replacement using 

a #25 freestyle porcine root 





BAV w AS/AI and dilated ascending aorta - s/p rsxn and replacement with porcine 

root. Sufficient length to reach distal aorta without dacron interposition 

graft. 





Postoperative PAF - continue BB/Amio/Eliquis 





Postoperative air leak - Resolved. Chest tubes d/c'd this AM





Acute expected blood loss anemia - Stable. No transfusions required. 





Disposition - Home 3/1 without services





Subjective: 





Feels well. Ready to go home.


Objective: 





 Vital Signs











Temp Pulse Resp BP Pulse Ox


 


 36.8 C   102 H  16   111/69   91 L


 


 02/28/17 07:58  02/28/17 07:54  02/28/17 07:58  02/28/17 07:54  02/28/17 07:58








 Laboratory Results





 02/27/17 05:00 





 02/28/17 05:30 





 











 02/27/17 02/28/17 03/01/17





 05:59 05:59 05:59


 


Intake Total 1720 880 


 


Output Total 1830 2085 90


 


Balance -110 -1205 -90








 











PT  14.2 SEC (12.0-15.0)   02/22/17  11:50    


 


INR  1.11  (0.83-1.16)   02/22/17  11:50    














Physical Exam





- Physical Exam


General Appearance: WD/WN, alert, no apparent distress, obese


EENT: No scleral icterus (R), No scleral icterus (L)


Neck: normal inspection


Respiratory: lungs clear, No respiratory distress


Cardiac/Chest: regular rate, rhythm


Abdomen: non-tender, soft, distended


Skin: normal color, warm/dry


Extremities: No pedal edema


Neuro/Psych: no motor/sensory deficits, alert, normal mood/affect, oriented x 3





ICD10 Worksheet


Patient Problems: 


 Problems











Problem Status Onset


 


Acute blood loss anemia Acute  


 


Coronary artery disease excluded Acute  ~02/22/17


 


S/P aortic valve replacement with stentless valve Acute  ~02/23/17


 


S/P ascending aortic replacement Acute  ~02/23/17


 


Aortic root aneurysm Chronic  


 


Aortic valve stenosis with insufficiency Chronic  


 


Bicuspid aortic valve Chronic

## 2017-03-01 VITALS — TEMPERATURE: 98.6 F | OXYGEN SATURATION: 91 %

## 2017-03-01 VITALS — HEART RATE: 105 BPM | DIASTOLIC BLOOD PRESSURE: 62 MMHG | SYSTOLIC BLOOD PRESSURE: 104 MMHG | RESPIRATION RATE: 18 BRPM

## 2017-03-01 LAB — POTASSIUM SERPL-SCNC: 4.1 MEQ/L (ref 3.5–5.2)

## 2017-03-01 RX ADMIN — ASPIRIN 81 MG SCH MG: 81 TABLET ORAL at 08:56

## 2017-03-01 RX ADMIN — POTASSIUM CHLORIDE SCH MEQ: 1500 TABLET, EXTENDED RELEASE ORAL at 08:55

## 2017-03-01 RX ADMIN — METOPROLOL TARTRATE SCH MG: 25 TABLET, FILM COATED ORAL at 08:56

## 2017-03-01 RX ADMIN — AMIODARONE HYDROCHLORIDE SCH MG: 200 TABLET ORAL at 08:56

## 2017-03-01 RX ADMIN — FUROSEMIDE SCH MG: 20 TABLET ORAL at 08:56

## 2017-03-01 RX ADMIN — APIXABAN SCH MG: 2.5 TABLET, FILM COATED ORAL at 08:56

## 2017-03-01 NOTE — SOAPPROG
SOAP Progress Note


Assessment/Plan: 


Assessment:  POD#6 AVR with root and partial ascending aortic replacement using 

a #25 freestyle porcine root 





BAV w AS/AI and dilated ascending aorta - s/p rsxn and replacement with porcine 

root. Sufficient length to reach distal aorta without dacron interposition 

graft. Antithrombotic prophylaxis as per rhythm.





Postoperative PAF - Responsive to amiodarone. Adjunctive rate control with BB. 

Eliquis added for expected recurrence. [id number] score of 1.





Postoperative air leak - Resolved. Chest tubes out yesterday.





Acute expected blood loss anemia - Stable. No transfusions required. 











Plan:


Home today.


Instructions re diet, meds, activity, f/u and wound care to be reviewed in 

presence of sister.








03/01/17 07:07











Subjective: 





Feels great. No acute concerns. Looking forward to shower and home.


Objective: 





 Vital Signs











Temp Pulse Resp BP Pulse Ox


 


 36.6 C   108 H  20   128/77 H  92 


 


 03/01/17 04:00  03/01/17 04:00  03/01/17 04:00  03/01/17 04:00  03/01/17 04:00








 Laboratory Results





 02/27/17 05:00 





 03/01/17 05:30 





 











 02/28/17 03/01/17 03/02/17





 05:59 05:59 05:59


 


Intake Total 880 600 


 


Output Total 2085 490 


 


Balance -1205 110 








 











PT  14.2 SEC (12.0-15.0)   02/22/17  11:50    


 


INR  1.11  (0.83-1.16)   02/22/17  11:50    








Persistent ST/AT (generally 90s). No AF. Insufficient BP for further 

uptitration BB.


Off O2. 1 kg above preop wt.


Ongoing diuresis. K stable.


CXR->mild bibasilar atelectasis.

















Physical Exam





- Physical Exam


General Appearance: alert, no apparent distress


Respiratory: lungs clear


Cardiac/Chest: regular rate, rhythm, tachycardia, other (Sternum grossly 

stable. Sternotomy and CT sites ok.)


Abdomen: normal bowel sounds, non-tender, soft


Skin: warm/dry


Extremities: swelling (trace)





ICD10 Worksheet


Patient Problems: 


 Problems











Problem Status Onset


 


Acute blood loss anemia Acute  


 


Coronary artery disease excluded Acute  ~02/22/17


 


S/P aortic valve replacement with stentless valve Acute  ~02/23/17


 


S/P ascending aortic replacement Acute  ~02/23/17


 


Aortic root aneurysm Chronic  


 


Aortic valve stenosis with insufficiency Chronic  


 


Bicuspid aortic valve Chronic

## 2017-03-01 NOTE — PDDCSUM
Discharge Summary


Discharge Summary: 





DATE OF ADMISSION: 17





DATE OF DISCHARGE: 3/1/17





DISPOSITION: Home, self-care.





PRINCIPAL ADMISSION DIAGNOSES: 


1. Bicuspid aortic valve with mild stenosis and moderate-severe insufficiency.


2. Enlarging aortic root aneurysm.





PRINCIPAL DISCHARGE DIAGNOSES:


1. Coronary artery disease excluded.


2. Carotid artery disease excluded.


3. Status post aortic valve and root replacement with a porcine root.


4. Acute expected blood loss anemia.


5. Postoperative atrial tachycardia with paroxysmal atrial fibrillation.








HISTORY OF PRESENT ILLNESS:


57 yo sedentary male, followed for BAV and dilated ascending aorta, evaluated 

for onset of NYHA class II symptoms and referred for surgical repair after 

discovered to have enlargement of aortic root to 5.7 cm along with moderate AI 

and mild LVDD. No LVE, LVSD, presyncope, or documented atrial fibrillation. 

Admitted in advance of valvular and aortic resection to complete risk 

stratification.





PERTINENT PAST MEDICAL HISTORY:


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 Assoc with 

knee joint abnormalities, webbed feet, and developmental delays. On disability. 

Lives with sister and mother.


HTN, benign.


Dyslipidemia.


Palpitations - PSVT by remote Holter monitoring. Controlled with CCB.


Obesity.





MEDICATIONS ON ADMISSION:


Valsartan 80 mg daily, Diltiazem 30 mg BID, ASA 81 mg daily, Lipitor 20 mg hs





ALLERGIES: NKDA





CONSULTANT:


Critical care/pulmonology (Herman)





PROCEDURES/IMAGIN/22 (Yadiel): Left heart catheterization with selective coronary 

angiography. Access via right radial artery.


 Carotid ultrasound


 (Serena): Aortic valve and root replacement with a 25 mm [employer] 

freestyle porcine root. Reimplantation of native left and right coronary 

arteries into the graft.


 (Robbie): Transthoracic echocardiogram.





ABBREVIATED HOSPITAL COURSE:


Preop imaging negative for coronary or carotid anomaly or disease and taken to 

the OR on hospital day #2. The aortic valve and aneurysmal aorta were resected 

and replaced with a porcine root. Pathology later returned confirming myxoid 

degenerative changes. The proximal aorta was able to be reached without an 

interposition graft. The procedure was well tolerated and without apparent 

complications. Easily  from CPB with preserved BiV systolic fx and 

normal bioprosthetic valve fx. Transferred to the ICU in hemodynamically stable 

condition. No prolonged vasoactive support, blood, blood products or backup 

pacing required. Postop course notable for persistent atrial tachycardia 

refractory to adenosine and low dose beta blocker. Bursts of AF/Fl apparent by 

POD#4 despite escalating metoprolol dosing and amiodarone added. Eliquis also 

started since LA appendage not ligated and recurrent SVT anticipated. Improved 

resting rate control noted by POD#6 and felt to be stable for discharge home 

under family's care. Preferential use of beta blockers next 3 months for 

adjunctive reduction in wall tension of resected aorta.





DISCHARGE CLINICAL INFORMATION:


Sternum grossly stable. Sternotomy CDI, sutured, +Dermabond.


HR 90s-100s. SBP 100s- 110s. SpO2 92% room air. Wt 1.3 kg above admission at 

101 kilos.


Hgb 9.7, HCT 29, Plt 110, Na 13, K 4.1, BUN 17, Cr 0.6.


CXR: Mild interstitial edema and right basilar atelectasis. 





DISCHARGE MEDICATIONS:


As on admission with the following adjustments:


1. Valsartan held.


2. Diltiazem held.


NEW prescriptions:


1. Amiodarone 200 mg BID x 1 month or as directed by cardiology.


2. Metoprolol 25 mg 1.5 tabs (37.5 mg) BID or as directed.


3. Lasix 20 mg 2 tabs (40 mg) daily until back to baseline weight and no 

swelling.


4. KlorCon 20mEQ daily with lasix.


5. Eliquis 2.5 mg BID x 1 month or as directed by cardiology.


6. Tramadol 50 mg q 6-8 hrs prn incisional discomfort.





FOLLOW UP APPOINTMENTS:


1.  CV Surgery: with Dr Lyons at [facility] on 


2.  Cardiology: with Dr Markham at [facility] in 4 weeks. Appointment to be 

established during surgical visit.





FOLLOW UP TESTING:


CXR prior to surgical appointment.

## 2017-03-03 LAB — O2 CONCENTRATIION: (no result) % (ref 0–100)

## 2017-03-07 ENCOUNTER — HOSPITAL ENCOUNTER (OUTPATIENT)
Dept: HOSPITAL 80 - FIMAGING | Age: 57
End: 2017-03-07
Attending: PHYSICIAN ASSISTANT
Payer: COMMERCIAL

## 2017-03-07 DIAGNOSIS — J81.0: ICD-10-CM

## 2017-03-07 DIAGNOSIS — Z98.890: ICD-10-CM

## 2017-03-07 DIAGNOSIS — Z09: Primary | ICD-10-CM

## 2017-03-07 DIAGNOSIS — J90: ICD-10-CM

## 2017-03-07 DIAGNOSIS — Z95.1: ICD-10-CM

## 2017-03-07 DIAGNOSIS — Z95.4: ICD-10-CM

## 2017-03-07 DIAGNOSIS — J98.11: ICD-10-CM

## 2017-03-15 ENCOUNTER — HOSPITAL ENCOUNTER (OUTPATIENT)
Dept: HOSPITAL 80 - FCATH | Age: 57
Discharge: HOME | End: 2017-03-15
Attending: INTERNAL MEDICINE
Payer: COMMERCIAL

## 2017-03-15 DIAGNOSIS — Z95.828: ICD-10-CM

## 2017-03-15 DIAGNOSIS — R00.0: ICD-10-CM

## 2017-03-15 DIAGNOSIS — E78.5: ICD-10-CM

## 2017-03-15 DIAGNOSIS — I48.92: Primary | ICD-10-CM

## 2017-03-15 LAB
ANION GAP SERPL CALC-SCNC: (no result) MEQ/L (ref 8–16)
CALCIUM SERPL-MCNC: (no result) MG/DL (ref 8.5–10.4)
CHLORIDE SERPL-SCNC: (no result) MEQ/L (ref 97–110)
CO2 SERPL-SCNC: (no result) MEQ/L (ref 22–31)
CREAT SERPL-MCNC: (no result) MG/DL (ref 0.7–1.3)
GFR SERPL CREATININE-BSD FRML MDRD: (no result) ML/MIN/{1.73_M2}
GLUCOSE SERPL-MCNC: (no result) MG/DL (ref 70–100)
MAGNESIUM SERPL-MCNC: (no result) MG/DL (ref 1.6–2.3)
POTASSIUM SERPL-SCNC: (no result) MEQ/L (ref 3.5–5.2)
SODIUM SERPL-SCNC: (no result) MEQ/L (ref 134–144)

## 2017-03-15 PROCEDURE — B245ZZ4 ULTRASONOGRAPHY OF LEFT HEART, TRANSESOPHAGEAL: ICD-10-PCS | Performed by: INTERNAL MEDICINE

## 2017-03-15 PROCEDURE — 5A2204Z RESTORATION OF CARDIAC RHYTHM, SINGLE: ICD-10-PCS | Performed by: INTERNAL MEDICINE

## 2017-03-15 NOTE — CPEKG
Heart Rate: 80

RR Interval: 750

P-R Interval: 192

QRSD Interval: 128

QT Interval: 408

QTC Interval: 471

P Axis: 31

QRS Axis: -30

T Wave Axis: 84

EKG Severity - ABNORMAL ECG -

EKG Impression: SINUS RHYTHM

EKG Impression: LEFT BUNDLE BRANCH BLOCK

EKG Impression: UNCHANGED IN COMPARISON TO PRIOR

Electronically Signed By: Olegario Markham 16-Mar-2017 16:41:34

## 2017-03-17 NOTE — EPPROC
Electrophysiology Procedure Note: 





Procedure: CV


Indication: Symptomatic Aflutter


Procedure: Pt sedtaed, RILEY performed.  No LA clot identified.  DCCV with 200J 

given, pt converted to SR.


Conclusion: SUccessful CV


Patient Problems: 


 Problems











Problem Status Onset


 


Acute blood loss anemia Acute  


 


Coronary artery disease excluded Acute  ~02/22/17


 


S/P aortic valve replacement with stentless valve Acute  ~02/23/17


 


S/P ascending aortic replacement Acute  ~02/23/17


 


Aortic root aneurysm Chronic  


 


Aortic valve stenosis with insufficiency Chronic  


 


Bicuspid aortic valve Chronic

## 2017-04-25 ENCOUNTER — HOSPITAL ENCOUNTER (INPATIENT)
Dept: HOSPITAL 80 - FED | Age: 57
LOS: 5 days | Discharge: HOME | DRG: 728 | End: 2017-04-30
Attending: INTERNAL MEDICINE | Admitting: INTERNAL MEDICINE
Payer: COMMERCIAL

## 2017-04-25 DIAGNOSIS — Z79.01: ICD-10-CM

## 2017-04-25 DIAGNOSIS — I44.7: ICD-10-CM

## 2017-04-25 DIAGNOSIS — N45.3: Primary | ICD-10-CM

## 2017-04-25 DIAGNOSIS — I25.10: ICD-10-CM

## 2017-04-25 DIAGNOSIS — I48.0: ICD-10-CM

## 2017-04-25 DIAGNOSIS — R22.2: ICD-10-CM

## 2017-04-25 DIAGNOSIS — Z95.3: ICD-10-CM

## 2017-04-25 DIAGNOSIS — E78.5: ICD-10-CM

## 2017-04-25 DIAGNOSIS — I10: ICD-10-CM

## 2017-04-25 LAB
% IMMATURE GRANULYOCYTES: 0.8 % (ref 0–1.1)
ABSOLUTE IMMATURE GRANULOCYTES: 0.17 10^3/UL (ref 0–0.1)
ABSOLUTE NRBC COUNT: 0 10^3/UL (ref 0–0.01)
ADD DIFF?: NO
ADD MORPH?: NO
ADD SCAN?: NO
ALBUMIN SERPL-MCNC: 3.6 G/DL (ref 3.5–5)
ALP SERPL-CCNC: 91 IU/L (ref 38–126)
ALT SERPL-CCNC: 27 IU/L (ref 21–72)
ANION GAP SERPL CALC-SCNC: 13 MEQ/L (ref 8–16)
AST SERPL-CCNC: 22 IU/L (ref 17–59)
ATYPICAL LYMPHOCYTE FLAG: 0 (ref 0–99)
BILIRUB SERPL-MCNC: 0.9 MG/DL (ref 0.1–1.4)
BILIRUBIN-CONJUGATED: 0.5 MG/DL (ref 0–0.5)
BILIRUBIN-UNCONJUGATED: 0.4 MG/DL (ref 0–1.1)
CALCIUM SERPL-MCNC: 8.7 MG/DL (ref 8.5–10.4)
CHLORIDE SERPL-SCNC: 107 MEQ/L (ref 97–110)
CO2 SERPL-SCNC: 21 MEQ/L (ref 22–31)
CREAT SERPL-MCNC: 1 MG/DL (ref 0.7–1.3)
ERYTHROCYTE [DISTWIDTH] IN BLOOD BY AUTOMATED COUNT: 14.5 % (ref 11.5–15.2)
FRAGMENT RBC FLAG: 0 (ref 0–99)
GFR SERPL CREATININE-BSD FRML MDRD: > 60 ML/MIN/{1.73_M2}
GLUCOSE SERPL-MCNC: 131 MG/DL (ref 70–100)
HCT VFR BLD CALC: 39.5 % (ref 40–51)
HGB BLD-MCNC: 12.6 G/DL (ref 13.7–17.5)
LEFT SHIFT FLG: 20 (ref 0–99)
LIPEMIA HEMOLYSIS FLAG: 80 (ref 0–99)
MCH RBC BLDCO QN: 28.1 PG (ref 27.9–34.1)
MCHC RBC AUTO-ENTMCNC: 31.9 G/DL (ref 32.4–36.7)
MCV RBC AUTO: 88 FL (ref 81.5–99.8)
NRBC-AUTO%: 0 % (ref 0–0.2)
PLATELET # BLD: 173 10^3/UL (ref 150–400)
PLATELET CLUMPS FLAG: 10 (ref 0–99)
PMV BLD AUTO: 9.3 FL (ref 8.7–11.7)
POTASSIUM SERPL-SCNC: 3.8 MEQ/L (ref 3.5–5.2)
PROT SERPL-MCNC: 6.8 G/DL (ref 6.3–8.2)
RBC # BLD AUTO: 4.49 10^6/UL (ref 4.4–6.38)
SODIUM SERPL-SCNC: 141 MEQ/L (ref 134–144)

## 2017-04-25 NOTE — EDPHY
H & P


Stated Complaint: R swollen testicle, x1 week, getting worse


Time Seen by Provider: 04/25/17 22:54


HPI/ROS: 





Chief Complaint:  Testicle swelling and pain





HPI:  57-year-old male presenting with scrotal swelling and pain. Patient 

states that started having some swelling about a week ago, got worse 2 days 

ago.  Was seen yesterday at the clinic and started on ciprofloxacin 500 mg 

twice a day.  He is presenting today with worsening swelling and worsening 

pain.  It is red.  It is warm to the touch.  Does not have a history of similar 

in the past.  No fevers or chills. No chest pain shortness of breath. No 

difficulty with urination.





ROS:  10 point Review of Systems is negative except as noted in the HPI.





PMH:  Aortic valve repair and aneurysm repair





Allergies:  No known drug allergies





Social History: No smoking, occasional alcohol,  no recreational drug use





Family History: non-contributory





Physical Exam:


Gen: Awake, Alert, No Distress


HEENT:  


     Nose: no rhinorrhea


     Eyes: PERRLA, EOMI


     Mouth: Moist mucosa 


Neck: Supple, no JVD


Chest: nontender, lungs clear to auscultation


Heart: S1, S2 normal, no murmur


Abd: Soft, non-tender, no guarding


General:  Diffusely swollen right greater than left testicles with significant 

erythema, warm to touch.  There is significant firm mass in the right testicle.

  There is no subcutaneous emphysema or other findings to suggest To's.


Back: no CVA tenderness, no midline tenderness 


Ext: no edema, non-tender


Skin: no rash


Neuro: CN II-XII intact, Sensation grossly intact, Strength 5/5 in bilateral 

upper and lower extremities








- Personal History


Current Tetanus/Diphtheria Vaccine: Unsure


Current Tetanus Diphtheria and Acellular Pertussis (TDAP): Unsure





- Medical/Surgical History


Hx Asthma: No


Hx Chronic Respiratory Disease: No


Hx Diabetes: No


Hx Cardiac Disease: Yes


Hx Renal Disease: No


Hx Cirrhosis: No


Hx Alcoholism: No


Hx HIV/AIDS: No


Hx Splenectomy or Spleen Trauma: No


Other PMH: coarctation of aorta patched in 1970, aortic root replacement, 

replaced ascending aorta





- Social History


Smoking Status: Never smoked


Constitutional: 


 Initial Vital Signs











Temperature (C)  37.5 C   04/25/17 22:48


 


Heart Rate  91   04/25/17 22:48


 


Respiratory Rate  18   04/25/17 22:48


 


Blood Pressure  126/73 H  04/25/17 22:48


 


O2 Sat (%)  95   04/25/17 22:48











Allergies/Adverse Reactions: 


 





No Known Allergies Allergy (Unverified 06/14/10 14:02)


 








Home Medications: 














 Medication  Instructions  Recorded


 


Aspirin [Aspirin 81mg (*)] 81 mg PO DAILY 06/14/10


 


Atorvastatin Calcium [Lipitor 20 20 mg PO HS 06/14/10





mg (*)]  


 


Acetaminophen [Tylenol 325mg (*)] 325 - 650 mg PO Q4HRS PRN #0 tab 03/01/17


 


Amiodarone HCl [Pacerone (*)] 200 mg PO BID #60 tab 03/01/17


 


Apixaban [Eliquis] 2.5 mg PO BID #60 tab 03/01/17


 


Furosemide [Lasix 20 MG (*)] 40 mg PO DAILY #30 tab 03/01/17


 


Metoprolol Tartrate [Lopressor 25 37.5 mg PO BID #90 tab 03/01/17





mg (*)]  


 


Potassium Cl [Klor-Con 20 meq (*)] 20 meq PO DAILY #15 tab 03/01/17


 


traMADol [Ultram 50 mg (*)] 50 mg PO Q6-8PRN PRN #30 tab 03/01/17


 


Xarelto  04/25/17














Medical Decision Making





- Diagnostics


Imaging Results: 


 Imaging Impressions





Testicular Ultrasound  04/25/17 23:13


Impression:


1. Probable right epididymoorchitis with possible left orchitis. 


2. Complex right hydrocele with no visible abscess.


3. Scrotal thickening 


4. Probable cysts in the left testicle. Consider ultrasound follow up in 6 

months.


 


Findings discussed with Easton Glez MD 4/26/2017 at 0:02. 











Imaging: Discussed imaging studies w/ On call Radiologist


ED Course/Re-evaluation: 





Patient is a noted dramatic leukocytosis of 20.  Ultrasound is consistent with 

bilateral orchitis and epididymitis.  Patient is not improving with Cipro.  I 

have discussed with Dr. Manriquez, hospitalist.  Will admit for further care





- Data Points


Laboratory Results: 


 Laboratory Results





 04/25/17 23:25 





 04/25/17 23:25 





 











  04/25/17 04/25/17





  23:25 23:25


 


WBC    20.86 10^3/uL H 10^3/uL





    (3.80-9.50) 


 


RBC    4.49 10^6/uL 10^6/uL





    (4.40-6.38) 


 


Hgb    12.6 g/dL L g/dL





    (13.7-17.5) 


 


Hct    39.5 % L %





    (40.0-51.0) 


 


MCV    88.0 fL fL





    (81.5-99.8) 


 


MCH    28.1 pg pg





    (27.9-34.1) 


 


MCHC    31.9 g/dL L g/dL





    (32.4-36.7) 


 


RDW    14.5 % %





    (11.5-15.2) 


 


Plt Count    173 10^3/uL 10^3/uL





    (150-400) 


 


MPV    9.3 fL fL





    (8.7-11.7) 


 


Neut % (Auto)    83.2 % H %





    (39.3-74.2) 


 


Lymph % (Auto)    6.3 % L %





    (15.0-45.0) 


 


Mono % (Auto)    9.2 % %





    (4.5-13.0) 


 


Eos % (Auto)    0.3 % L %





    (0.6-7.6) 


 


Baso % (Auto)    0.2 % L %





    (0.3-1.7) 


 


Nucleat RBC Rel Count    0.0 % %





    (0.0-0.2) 


 


Absolute Neuts (auto)    17.34 10^3/uL H 10^3/uL





    (1.70-6.50) 


 


Absolute Lymphs (auto)    1.32 10^3/uL 10^3/uL





    (1.00-3.00) 


 


Absolute Monos (auto)    1.92 10^3/uL H 10^3/uL





    (0.30-0.80) 


 


Absolute Eos (auto)    0.06 10^3/uL 10^3/uL





    (0.03-0.40) 


 


Absolute Basos (auto)    0.05 10^3/uL 10^3/uL





    (0.02-0.10) 


 


Absolute Nucleated RBC    0.00 10^3/uL 10^3/uL





    (0-0.01) 


 


Immature Gran %    0.8 % %





    (0.0-1.1) 


 


Immature Gran #    0.17 10^3/uL H 10^3/uL





    (0.00-0.10) 


 


Sodium  141 mEq/L mEq/L  





   (134-144)  


 


Potassium  3.8 mEq/L mEq/L  





   (3.5-5.2)  


 


Chloride  107 mEq/L mEq/L  





   ()  


 


Carbon Dioxide  21 mEq/l L mEq/l  





   (22-31)  


 


Anion Gap  13 mEq/L mEq/L  





   (8-16)  


 


BUN  18 mg/dL mg/dL  





   (7-23)  


 


Creatinine  1.0 mg/dL mg/dL  





   (0.7-1.3)  


 


Estimated GFR  > 60   





   


 


Glucose  131 mg/dL H mg/dL  





   ()  


 


Calcium  8.7 mg/dL mg/dL  





   (8.5-10.4)  


 


Total Bilirubin  0.9 mg/dL mg/dL  





   (0.1-1.4)  


 


Conjugated Bilirubin  0.5 mg/dL mg/dL  





   (0.0-0.5)  


 


Unconjugated Bilirubin  0.4 mg/dL mg/dL  





   (0.0-1.1)  


 


AST  22 IU/L IU/L  





   (17-59)  


 


ALT  27 IU/L IU/L  





   (21-72)  


 


Alkaline Phosphatase  91 IU/L IU/L  





   ()  


 


Total Protein  6.8 g/dL g/dL  





   (6.3-8.2)  


 


Albumin  3.6 g/dL g/dL  





   (3.5-5.0)  


 


Lipase  48.0 IU/L IU/L  





   ()  














Departure





- Departure


Disposition: [facility] Inpatient Acute


Clinical Impression: 


 Orchitis, Epididymitis





Condition: Fair


Referrals: 


Titi Faulkner MD [Primary Care Provider] - As per Instructions

## 2017-04-26 LAB
% IMMATURE GRANULYOCYTES: 1.1 % (ref 0–1.1)
ABSOLUTE IMMATURE GRANULOCYTES: 0.19 10^3/UL (ref 0–0.1)
ABSOLUTE NRBC COUNT: 0 10^3/UL (ref 0–0.01)
ADD DIFF?: NO
ADD MORPH?: NO
ADD SCAN?: NO
ANION GAP SERPL CALC-SCNC: 8 MEQ/L (ref 8–16)
APTT BLD: 50.8 SEC (ref 23–38)
ATYPICAL LYMPHOCYTE FLAG: 0 (ref 0–99)
CALCIUM SERPL-MCNC: 8.4 MG/DL (ref 8.5–10.4)
CHLORIDE SERPL-SCNC: 106 MEQ/L (ref 97–110)
CO2 SERPL-SCNC: 23 MEQ/L (ref 22–31)
COLOR UR: YELLOW
CREAT SERPL-MCNC: 0.9 MG/DL (ref 0.7–1.3)
ERYTHROCYTE [DISTWIDTH] IN BLOOD BY AUTOMATED COUNT: 14.5 % (ref 11.5–15.2)
FRAGMENT RBC FLAG: 0 (ref 0–99)
GFR SERPL CREATININE-BSD FRML MDRD: > 60 ML/MIN/{1.73_M2}
GLUCOSE SERPL-MCNC: 111 MG/DL (ref 70–100)
HCT VFR BLD CALC: 35.9 % (ref 40–51)
HGB BLD-MCNC: 11.6 G/DL (ref 13.7–17.5)
INR PPP: 1.99 (ref 0.83–1.16)
LEFT SHIFT FLG: 30 (ref 0–99)
LIPEMIA HEMOLYSIS FLAG: 80 (ref 0–99)
MAGNESIUM SERPL-MCNC: 1.8 MG/DL (ref 1.6–2.3)
MCH RBC BLDCO QN: 28.4 PG (ref 27.9–34.1)
MCHC RBC AUTO-ENTMCNC: 32.3 G/DL (ref 32.4–36.7)
MCV RBC AUTO: 87.8 FL (ref 81.5–99.8)
MUCOUS THREADS #/AREA URNS LPF: (no result) /LPF
NITRITE UR QL STRIP: NEGATIVE
NRBC-AUTO%: 0 % (ref 0–0.2)
PH UR STRIP: 6 [PH] (ref 5–7.5)
PLATELET # BLD: 155 10^3/UL (ref 150–400)
PLATELET CLUMPS FLAG: 20 (ref 0–99)
PMV BLD AUTO: 9.6 FL (ref 8.7–11.7)
POTASSIUM SERPL-SCNC: 3.4 MEQ/L (ref 3.5–5.2)
PROTHROMBIN TIME: 22.7 SEC (ref 12–15)
RBC # BLD AUTO: 4.09 10^6/UL (ref 4.4–6.38)
RBC #/AREA URNS HPF: (no result) /HPF (ref 0–3)
SODIUM SERPL-SCNC: 137 MEQ/L (ref 134–144)
SP GR UR STRIP: 1.02 (ref 1–1.03)
WBC #/AREA URNS HPF: (no result) /HPF (ref 0–3)

## 2017-04-26 RX ADMIN — RIVAROXABAN SCH MG: 15 TABLET, FILM COATED ORAL at 11:28

## 2017-04-26 RX ADMIN — SODIUM CHLORIDE SCH MLS: 900 INJECTION, SOLUTION INTRAVENOUS at 21:18

## 2017-04-26 RX ADMIN — ACETAMINOPHEN PRN MG: 325 TABLET ORAL at 01:44

## 2017-04-26 RX ADMIN — AMIODARONE HYDROCHLORIDE SCH MG: 200 TABLET ORAL at 11:29

## 2017-04-26 RX ADMIN — ACETAMINOPHEN PRN MG: 325 TABLET ORAL at 16:48

## 2017-04-26 RX ADMIN — ASPIRIN 81 MG SCH MG: 81 TABLET ORAL at 11:29

## 2017-04-26 RX ADMIN — SODIUM CHLORIDE SCH MLS: 900 INJECTION, SOLUTION INTRAVENOUS at 02:18

## 2017-04-26 RX ADMIN — OXYCODONE HYDROCHLORIDE PRN MG: 15 TABLET ORAL at 19:33

## 2017-04-26 RX ADMIN — METOPROLOL TARTRATE SCH MG: 25 TABLET, FILM COATED ORAL at 21:18

## 2017-04-26 RX ADMIN — ATORVASTATIN CALCIUM SCH MG: 20 TABLET, FILM COATED ORAL at 21:18

## 2017-04-26 RX ADMIN — METOPROLOL TARTRATE SCH MG: 25 TABLET, FILM COATED ORAL at 11:28

## 2017-04-26 RX ADMIN — CLINDAMYCIN PHOSPHATE SCH MLS: 18 INJECTION, SOLUTION INTRAVENOUS at 06:23

## 2017-04-26 RX ADMIN — CLINDAMYCIN PHOSPHATE SCH MLS: 18 INJECTION, SOLUTION INTRAVENOUS at 14:26

## 2017-04-26 RX ADMIN — AMIODARONE HYDROCHLORIDE SCH MG: 200 TABLET ORAL at 21:18

## 2017-04-26 RX ADMIN — CLINDAMYCIN PHOSPHATE SCH MLS: 18 INJECTION, SOLUTION INTRAVENOUS at 21:18

## 2017-04-26 NOTE — HOSPPROG
Hospitalist Progress Note


Assessment/Plan: 





DIAGNOSES: 


1-Acute epididymal orchitis, unresponsive to oral quinolone outpatient 

antibiotic; sensitive E coli growing in cultures done in clinic April 24th


2-borderline changes for early sepsis


3-History of paroxysmal atrial fibrillation, rate controlled on Coumadin with 

therapeutic INR ; INR needs watching on antibiotics





PLANS:


-Urology consultation is requested and I did review the case with Dr. Diann Hendricks


-Dr. Hendricks recommended CT scan to evaluate for possible prostatic or 

perirectal abscess, and I have ordered this test


-Continue IV antibiotics at this time currently receiving Rocephin and 

clindamycin





SUBJECTIVE:


 patient still has very significant pain in the scrotum and testes.


He is having sweats but no actual rigors


Appetite is okay, no shortness of breath or chest or abdominal pain





OBJECTIVE


Vitals reviewed:Stable blood pressure and pulse, no fever so far today





Exam:


alert oriented 


skin warm dry color ok


resps not labored


lungs clear BSs


heart regular


abd soft nondistended nontender, bowel sounds present


 the scrotum is erythematous and with quite a bit of swelling particularly on 

the right side and the contents very tender.  


limbs warm, no edema


iv site ok





 I reviewed the report from testicular ultrasound done earlier today.  This 

shows evidence of epididymitis and probable mild or ketosis but no abscess no 

stone.


Objective: 


 Vital Signs











Temp Pulse Resp BP Pulse Ox


 


 37.1 C   88   16   107/72   92 


 


 04/26/17 16:37  04/26/17 16:37  04/26/17 16:37  04/26/17 16:37  04/26/17 16:37








 Laboratory Results





 04/26/17 04:15 





 04/26/17 04:15 





 











 04/25/17 04/26/17 04/27/17





 06:59 06:59 06:59


 


Intake Total  349 


 


Output Total  750 200


 


Balance  -401 -200








 











PT  22.7 SEC (12.0-15.0)  H  04/26/17  04:15    


 


INR  1.99  (0.83-1.16)  H  04/26/17  04:15    














ICD10 Worksheet


Patient Problems: 


 Problems











Problem Status Onset


 


Epididymitis Acute  


 


Orchitis Acute  


 


Acute blood loss anemia Acute  


 


Coronary artery disease excluded Acute  ~02/22/17


 


S/P aortic valve replacement with stentless valve Acute  ~02/23/17


 


S/P ascending aortic replacement Acute  ~02/23/17


 


Aortic root aneurysm Chronic  


 


Aortic valve stenosis with insufficiency Chronic  


 


Bicuspid aortic valve Chronic

## 2017-04-26 NOTE — PDGENHP
History and Physical





- Chief Complaint


testicular pain and swellling





- History of Present Illness


Patient is a 57-year-old male with a history of hypertension, hyperlipidemia, 

paroxysmal AFib on Xarelto and recently repaired bicuspid aortic valve who 

presents to the ED with complaint of right testicular pain, swelling and 

erythema. Patient states he first noticed painless swelling about 3 days ago, 

which progressed over the following days to including surrounding erythema and 

significant pain. He was evaluated by his PMD yesterday, was told he likely had 

an acute infection of his epididymus/prostate, he was given one dose of IV 

ceftriaxone while in the office and then given a prescription for oral Cipro. 

Patient filled this prescription and has taken 3 doses, but today he felt 

erythema, swelling and pain was worsening, extending into his L testicle/

scrotal area. He also reported some subjective fever today, so he was advised 

to go to the ED for further evaluation.





On arrival to the ED, patient was afebrile and hemodynamically stable. Labs 

revealed a significant leukocytosis, normal BMP and renal function. Testicular 

US was obtained and revealed evidence of acute epididymoorchitis without 

evidence of abscess or subcutaneous gas. He was cultured and initiated on 

antibiotics and admitted to the hospitalist service for further management. 





History Information





- Allergies/Home Medication List


Allergies/Adverse Reactions: 








No Known Allergies Allergy (Unverified 06/14/10 14:02)


 





Home Medications: 








Aspirin [Aspirin 81mg (*)] 81 mg PO DAILY 06/14/10 [Last Taken 02/22/17]


Atorvastatin Calcium [Lipitor 20 mg (*)] 20 mg PO HS 06/14/10 [Last Taken 02/21/ 17]


Xarelto  04/25/17 [Last Taken Unknown]





I have personally reviewed and updated: family history, medical history, social 

history, surgical history





- Past Medical History


Additional medical history: hypertension.  hyperlipidemia.  paroxysmal AFib on 

Xarelto.  bicuspid aortic valve, s/p repair.  congenital coarctation of the 

aorta





- Surgical History


Additional surgical history: bicuspid aortic vavle repair.  coarctation of 

aorta repair





- Family History


Positive for: non-pertinent





- Social History


Smoking Status: Never smoked


Alcohol Use: None


Drug Use: None


Additional social history: Lives with his mother and sister, is independent.





Review of Systems


ROS: 10pt was reviewed & negative except for what was stated in HPI & below





Physical Exam














Temp Pulse Resp BP Pulse Ox


 


 37.5 C   91   18   126/73 H  95 


 


 04/25/17 22:48  04/25/17 22:48  04/25/17 22:48  04/25/17 22:48  04/25/17 22:48











Constitutional: no apparent distress, appears nourished, not in pain


Eyes: PERRL, anicteric sclera, EOMI


Ears, Nose, Mouth, Throat: moist mucous membranes, hearing normal, ears appear 

normal, no oral mucosal ulcers


Cardiovascular: regular rate and rhythym, no murmur, rub, or gallop, pulses 

symmetric bilaterally, edema (nonpitting edema bilaterally lower extremities), 

No JVD


Peripheral Pulses: 2+: dorsalis-pedis (R), dorsalis-pedis (L)


Respiratory: no respiratory distress, no rales or rhonchi, clear to auscultation


Gastrointestinal: normoactive bowel sounds, soft, non-tender abdomen, no 

palpable masses, No guarding, No rebound


Genitourinary: other (moderate to severe scrotal swelling with diffuse erythema

; focal tenderness to palpation on L testicle; no obvious skin breakdown)


Musculoskeletal: full muscle strength, no muscle tenderness, normal joint ROM, 

no joint effusions


Neurologic: AAOx3, sensation intact bilaterally, CN II-XII Intact, No weakness, 

No numbness


Psychiatric: interacting appropriately, not anxious, not encephalopathic, 

thought process linear





Lab Data & Imaging Review





 04/25/17 23:25





 04/25/17 23:25














WBC  20.86 10^3/uL (3.80-9.50)  H  04/25/17  23:25    


 


RBC  4.49 10^6/uL (4.40-6.38)   04/25/17  23:25    


 


Hgb  12.6 g/dL (13.7-17.5)  L  04/25/17  23:25    


 


Hct  39.5 % (40.0-51.0)  L  04/25/17  23:25    


 


MCV  88.0 fL (81.5-99.8)   04/25/17  23:25    


 


MCH  28.1 pg (27.9-34.1)   04/25/17  23:25    


 


MCHC  31.9 g/dL (32.4-36.7)  L  04/25/17  23:25    


 


RDW  14.5 % (11.5-15.2)   04/25/17  23:25    


 


Plt Count  173 10^3/uL (150-400)   04/25/17  23:25    


 


MPV  9.3 fL (8.7-11.7)   04/25/17  23:25    


 


Neut % (Auto)  83.2 % (39.3-74.2)  H  04/25/17  23:25    


 


Lymph % (Auto)  6.3 % (15.0-45.0)  L  04/25/17  23:25    


 


Mono % (Auto)  9.2 % (4.5-13.0)   04/25/17  23:25    


 


Eos % (Auto)  0.3 % (0.6-7.6)  L  04/25/17 23:25    


 


Baso % (Auto)  0.2 % (0.3-1.7)  L  04/25/17  23:25    


 


Nucleat RBC Rel Count  0.0 % (0.0-0.2)   04/25/17 23:25    


 


Absolute Neuts (auto)  17.34 10^3/uL (1.70-6.50)  H  04/25/17  23:25    


 


Absolute Lymphs (auto)  1.32 10^3/uL (1.00-3.00)   04/25/17  23:25    


 


Absolute Monos (auto)  1.92 10^3/uL (0.30-0.80)  H  04/25/17  23:25    


 


Absolute Eos (auto)  0.06 10^3/uL (0.03-0.40)   04/25/17  23:25    


 


Absolute Basos (auto)  0.05 10^3/uL (0.02-0.10)   04/25/17  23:25    


 


Absolute Nucleated RBC  0.00 10^3/uL (0-0.01)   04/25/17  23:25    


 


Immature Gran %  0.8 % (0.0-1.1)   04/25/17  23:25    


 


Immature Gran #  0.17 10^3/uL (0.00-0.10)  H  04/25/17  23:25    


 


Sodium  141 mEq/L (134-144)   04/25/17  23:25    


 


Potassium  3.8 mEq/L (3.5-5.2)   04/25/17  23:25    


 


Chloride  107 mEq/L ()   04/25/17  23:25    


 


Carbon Dioxide  21 mEq/l (22-31)  L  04/25/17  23:25    


 


Anion Gap  13 mEq/L (8-16)   04/25/17  23:25    


 


BUN  18 mg/dL (7-23)   04/25/17  23:25    


 


Creatinine  1.0 mg/dL (0.7-1.3)   04/25/17  23:25    


 


Estimated GFR  > 60   04/25/17  23:25    


 


Glucose  131 mg/dL ()  H  04/25/17  23:25    


 


Calcium  8.7 mg/dL (8.5-10.4)   04/25/17  23:25    


 


Total Bilirubin  0.9 mg/dL (0.1-1.4)   04/25/17  23:25    


 


Conjugated Bilirubin  0.5 mg/dL (0.0-0.5)   04/25/17  23:25    


 


Unconjugated Bilirubin  0.4 mg/dL (0.0-1.1)   04/25/17  23:25    


 


AST  22 IU/L (17-59)   04/25/17  23:25    


 


ALT  27 IU/L (21-72)   04/25/17  23:25    


 


Alkaline Phosphatase  91 IU/L ()   04/25/17  23:25    


 


Total Protein  6.8 g/dL (6.3-8.2)   04/25/17  23:25    


 


Albumin  3.6 g/dL (3.5-5.0)   04/25/17  23:25    


 


Lipase  48.0 IU/L ()   04/25/17  23:25    








Visualized and Interpreted imaging results: Yes


Interpretation: Testicular US: Probable right epididymoorchitis with possible 

left orchitis


Visualized and Interpreted EKG results: Yes


EKG Interpretation: Positive for: left bundle branch block (old, unchanged from 

priors)





Assessment & Plan


Assessment: 


 Patient is a 57 year old male with HTN, HLD, paroxysmal Afib who presents to 

the ED with scrotal pain, swelling and erythema consistent with acute 

epididymoorchitis that failed outpatient treatment with oral antibiotics. 





Plan: 





# acute epididymoorchitis


Patient's exam and ultrasound findings are consistent with acute 

epididymoorchitis. He was correctly diagnosed with this in the outpatient 

setting, however, symptoms have progressed despite oral antibiotic therapy. UA 

is negative. On arrival to the ED, patient meets sirs/sepsis criteria with 

leukocytosis and mild tachycardia, no evidence of severe sepsis. He was 

cultured and initiated on antibiotic therapy. There are reports amiodarone can 

cause sterile epididymitis, however, given signs of sepsis, will presume 

infectious etiology and continue levaquin/clindamycin until cultures result.





# paroxysmal Afib


EKG shows sinus rhythm with old underlying LBBB. Patient reports afib is a 

relatively new diagnosis and he was started on amiodarone around the time his 

aortic valve was repaired. Will continue his systemic anticoagulation and beta-

blocker, consider switching amiodarone if it is the cause of above problem.





# HTN/HLD


Cont home meds. 





# dispo: admit to inpatient service for management of acute infection that 

failed outpatient therapy





# gen:


 cardiac diet


DVT ppx: on eliquis


Full code

## 2017-04-26 NOTE — CPEKG
Heart Rate: 94

RR Interval: 638

P-R Interval: 172

QRSD Interval: 132

QT Interval: 392

QTC Interval: 491

P Axis: 24

QRS Axis: -34

T Wave Axis: 93

EKG Severity - ABNORMAL ECG -

EKG Impression: SINUS RHYTHM

EKG Impression: LEFT BUNDLE BRANCH BLOCK

Electronically Signed By: Yazmin España 26-Apr-2017 10:34:33

## 2017-04-27 LAB
% IMMATURE GRANULYOCYTES: 0.8 % (ref 0–1.1)
ABSOLUTE IMMATURE GRANULOCYTES: 0.09 10^3/UL (ref 0–0.1)
ABSOLUTE NRBC COUNT: 0 10^3/UL (ref 0–0.01)
ADD DIFF?: NO
ADD MORPH?: NO
ADD SCAN?: NO
ANION GAP SERPL CALC-SCNC: 8 MEQ/L (ref 8–16)
ATYPICAL LYMPHOCYTE FLAG: 0 (ref 0–99)
CALCIUM SERPL-MCNC: 8.3 MG/DL (ref 8.5–10.4)
CHLORIDE SERPL-SCNC: 109 MEQ/L (ref 97–110)
CO2 SERPL-SCNC: 22 MEQ/L (ref 22–31)
CREAT SERPL-MCNC: 0.8 MG/DL (ref 0.7–1.3)
ERYTHROCYTE [DISTWIDTH] IN BLOOD BY AUTOMATED COUNT: 14.4 % (ref 11.5–15.2)
FRAGMENT RBC FLAG: 0 (ref 0–99)
GFR SERPL CREATININE-BSD FRML MDRD: > 60 ML/MIN/{1.73_M2}
GLUCOSE SERPL-MCNC: 96 MG/DL (ref 70–100)
HCT VFR BLD CALC: 38.6 % (ref 40–51)
HGB BLD-MCNC: 12 G/DL (ref 13.7–17.5)
LEFT SHIFT FLG: 10 (ref 0–99)
LIPEMIA HEMOLYSIS FLAG: 80 (ref 0–99)
MCH RBC BLDCO QN: 27.6 PG (ref 27.9–34.1)
MCHC RBC AUTO-ENTMCNC: 31.1 G/DL (ref 32.4–36.7)
MCV RBC AUTO: 88.9 FL (ref 81.5–99.8)
NRBC-AUTO%: 0 % (ref 0–0.2)
PLATELET # BLD: 172 10^3/UL (ref 150–400)
PLATELET CLUMPS FLAG: 0 (ref 0–99)
PMV BLD AUTO: 9.9 FL (ref 8.7–11.7)
POTASSIUM SERPL-SCNC: 3.9 MEQ/L (ref 3.5–5.2)
RBC # BLD AUTO: 4.34 10^6/UL (ref 4.4–6.38)
SODIUM SERPL-SCNC: 139 MEQ/L (ref 134–144)

## 2017-04-27 RX ADMIN — METOPROLOL TARTRATE SCH MG: 25 TABLET, FILM COATED ORAL at 07:59

## 2017-04-27 RX ADMIN — ASPIRIN 81 MG SCH MG: 81 TABLET ORAL at 07:59

## 2017-04-27 RX ADMIN — OXYCODONE HYDROCHLORIDE PRN MG: 15 TABLET ORAL at 05:41

## 2017-04-27 RX ADMIN — CLINDAMYCIN PHOSPHATE SCH MLS: 18 INJECTION, SOLUTION INTRAVENOUS at 05:41

## 2017-04-27 RX ADMIN — ATORVASTATIN CALCIUM SCH MG: 20 TABLET, FILM COATED ORAL at 21:29

## 2017-04-27 RX ADMIN — SODIUM CHLORIDE SCH MLS: 900 INJECTION, SOLUTION INTRAVENOUS at 05:42

## 2017-04-27 RX ADMIN — ACETAMINOPHEN PRN MG: 325 TABLET ORAL at 05:42

## 2017-04-27 RX ADMIN — AMIODARONE HYDROCHLORIDE SCH MG: 200 TABLET ORAL at 21:30

## 2017-04-27 RX ADMIN — AMIODARONE HYDROCHLORIDE SCH MG: 200 TABLET ORAL at 07:59

## 2017-04-27 RX ADMIN — OXYCODONE HYDROCHLORIDE PRN MG: 15 TABLET ORAL at 00:10

## 2017-04-27 RX ADMIN — RIVAROXABAN SCH MG: 15 TABLET, FILM COATED ORAL at 07:59

## 2017-04-27 RX ADMIN — CLINDAMYCIN PHOSPHATE SCH MLS: 18 INJECTION, SOLUTION INTRAVENOUS at 14:12

## 2017-04-27 RX ADMIN — CLINDAMYCIN PHOSPHATE SCH MLS: 18 INJECTION, SOLUTION INTRAVENOUS at 21:30

## 2017-04-27 RX ADMIN — METOPROLOL TARTRATE SCH MG: 25 TABLET, FILM COATED ORAL at 21:30

## 2017-04-27 NOTE — HOSPPROG
Hospitalist Progress Note


Assessment/Plan: 


 Assessment:  57-year-old male presents with acute orchitis and epididymitis, 

failing outpatient for quinolone treatment





Plan:





1. Orchitis left, epididymitis right.  Acute, evidenced by fever, significant 

leukocytosis, pain, induration, E coli on outpatient culture results


-despite the organism being sensitive to fluoroquinolones on culture, the 

patient reportedly failed quinolone treatment clinically with his ongoing fevers

, worsening leukocytosis and symptoms


-patient's symptoms have been improving with IV ceftriaxone and clindamycin, 

continue for an additional 24 hours and then adjust to cefpodoxime and oral 

clindamycin tomorrow for a total of 7-10 days treatment


-ongoing scrotal symptoms include induration, mild tenderness, patient is 

adhering to elevation


-ultrasound demonstrating no evidence of abscess, abdominal pelvis CT 

demonstrating no evidence of abscess


-appreciate urology consultation, follow up with Dr. Hendricks as an outpatient





2. Pre-coccygeal mass.  Identified on CT, unclear significance, urology and 

surgery do not believe this is associated with his current presentation


-discussed with Dr. Pollack, we had been prepared to perform a CT-guided biopsy 

today but the patient had taken Xarelto and aspirin this morning for his CAD 

and paroxysmal atrial fibrillation


-consequently, the plan has been modified such that the patient will be most 

likely discharged home tomorrow on oral antibiotics, to complete an additional 

7 days of treatment, then get the area biopsied by CT guidance by Dr. Pollack


-Dr. Pollack is information has been included in the discharge summary and the 

patient should contact Dr. Pollack to schedule the procedure, receiving 

instructions regarding holding Xarelto and aspirin prior to the procedure





3. Coronary artery disease.  Chronic, continue aspirin and beta-blocker





4. Paroxysmal atrial fibrillation.  Continue on amiodarone, metoprolol, Xarelto





Diet.  Resume cardiac





Prophylaxis.  High risk, currently anticoagulated





Code.  Full





Disposition.  Anticipated discharge is 4/28, pending continued improvement in 

patient's leukocytosis, improvement in scrotal symptoms.





Subjective: patient reports that pain is improving in his scrotum


Objective: 


 Vital Signs











Temp Pulse Resp BP Pulse Ox


 


 36.9 C   88   18   114/76   94 


 


 04/27/17 14:51  04/27/17 14:51  04/27/17 14:51  04/27/17 14:51  04/27/17 14:51








 Laboratory Results





 04/27/17 04:27 04/27/17 04:27 





 











 04/26/17 04/27/17 04/28/17





 05:59 05:59 05:59


 


Intake Total 349 1850 500


 


Output Total 150 1820 650


 


Balance 199 30 -150








 











PT  22.7 SEC (12.0-15.0)  H  04/26/17  04:15    


 


INR  1.99  (0.83-1.16)  H  04/26/17  04:15    














- Pending Discharge


Pending Discharge Within 24 Hours: Yes


Pending Discharge Date: 04/28/17


Pending Discharge Time: 11:00





- Physical Exam


Constitutional: no apparent distress, appears nourished, not in pain


Cardiovascular: No systolic murmur, No irregularly irregular, No tachycardia, 

No edema


Respiratory: no respiratory distress, no rales or rhonchi, clear to auscultation


Gastrointestinal: normoactive bowel sounds, soft, non-tender abdomen, no 

palpable masses


Genitourinary: no bladder fullness, other ( indurated scrotum, very mildly 

tender in the left inferior aspect, edematous scrotum, normal penis)


Skin: other ( mild induration over the scrotum)


Neurologic: AAOx3, No facial droop


Psychiatric: interacting appropriately, not anxious, not encephalopathic, 

thought process linear





ICD10 Worksheet


Patient Problems: 


 Problems











Problem Status Onset


 


Orchitis Acute  


 


Epididymitis Acute  


 


S/P ascending aortic replacement Acute  ~02/23/17


 


Acute blood loss anemia Acute  


 


Aortic root aneurysm Chronic  


 


S/P aortic valve replacement with stentless valve Acute  ~02/23/17


 


Aortic valve stenosis with insufficiency Chronic  


 


Bicuspid aortic valve Chronic  


 


Coronary artery disease excluded Acute  ~02/22/17

## 2017-04-27 NOTE — GCON
[f rep st]



                                                                    CONSULTATION





CHIEF COMPLAINT:  Coccygeal mass.



HISTORY OF PRESENT ILLNESS:  This is a 57-year-old male, who is admitted currently to the medical [facility] and has been so since the evening of the 25th.  Briefly, he was admitted with testicular pain,
 swelling and erythema, noticing that pain 3 days prior to his presentation stating that it has prog
ressed in both pain erythema and intensity.  Since his admission, he has been on the hospital servic
e receiving IV antibiotics and has been improving.  Today, as part of a workup for possible source, 
he received a CT scan of his abdomen and pelvis which was concerning as it showed a precoccygeal mas
s 3.8 x 3.6 x 3.3 cm extending to the anterior margin of the tip of the coccyx.  I was asked to eval
uate this.  Upon speaking with the patient, he states that the pain and inflammation in the scrotal 
area has been improving.  On further review, the patient states he has never had any rectal issues i
n the past.  He has never had a colonoscopy.  He denies having any significant changes with bowel fu
nction, and states that he has daily bowel movements which are usually formed to loose, which is nor
mal for him.  He does endorse that a few months ago he did lose 20 pounds, but promptly gained it ba
ck.  He denies having any other concerning signs including night sweats, fevers, or chills, prior to
 this presentation.  He denies having any personal or family history of inflammatory bowel disease i
ncluding Crohn's or ulcerative colitis and states that he has, other than the pain and swelling, no 
complaints in the rectal area.  He had never had a perirectal abscess or any instrumentation of that
 area in the past.



PAST MEDICAL HISTORY:  Hypertension, hyperlipidemia, paroxysmal atrial fibrillation on Xarelto, bicu
spid aortic valve status post repair, and congenital coarctation of the aorta.



PAST SURGICAL HISTORY:  Bicuspid aortic valve repair and repair of his aortic coarctation.



REVIEW OF SYSTEMS:  A full 10-point review was performed, and unless explicitly stated is otherwise 
negative.



SOCIAL HISTORY:  Denies alcohol, illicit drug and smoking use.  Lives with his mother and sister ind
ependently.



CURRENT MEDICATIONS:  Xarelto.



PHYSICAL EXAM:  VITAL SIGNS:  Temp 36.9, blood pressure 114/74, heart rate 82, and he is 93% on room
 air.  GENERAL:  He is alert and oriented, in no acute distress.  LUNGS:  Clear to auscultation bila
terally.  CV:  He is hemodynamically stable with a regular rate and rhythm.  ABDOMEN:  Soft, obese, 
nondistended, nontender.  RECTAL:  Reveals a firm, movable object in the left rectal space consisten
t with imaging.  Does not appear to communicate with the perirectal area in any way.  It is nontende
r.  The remainder of the rectal exam is normal.  EXTREMITIES:  Warm and well perfused.



LABORATORY DATA:  Leukocytosis up from 20,000 down to 11,000 today, H and H stable at 12 and 39, miguel
telets 172.  Chemistries are unremarkable.  CT scan performed today shows a 3.8 x 3.6 x 3.3 precoccy
geal mass which does not appear to communicate with the rectum.



ASSESSMENT AND PLAN:  A 57-year-old male, with incidental finding of precoccygeal mass on CT scan.  
After discussing it with the patient today, this does not appear to be a rectal mass, but does appea
r to be precoccygeal in nature, the etiology of which is currently unknown, but I do not feel that i
t is currently causing and/or contributing to his infection.  I did tell him that I was concerned an
d that it requires further workup.  However, I feel that this can be deferred given the fact that he
 is currently being treated for fairly aggressive infection in the surrounding area and he is also o
n Xarelto which would need to be held prior to any other workup.  I do feel that a biopsy of the are
a would be prudent, as if it did turn out to be something that would not need surgery, this would sa
ve him a fair amount of morbidity.  I did tell him that if we are unable to obtain a diagnosis or if
 it does come back as something that would require further surgery, that we would work that up and p
roceed as such.  At any rate, I do not feel that this needs to happen this current admission given h
is current underlying infection and treatment therein.  We will continue to follow and will make eddie
e that the patient follows up in my clinic once the infection resolves so [facility].





Job #:  [id number]

## 2017-04-27 NOTE — GCON
[f 
rep st]



                                                                    CONSULTATION





DATE OF CONSULTATION:  04/27/2017



REASON FOR CONSULTATION:  Right epididymitis.



HISTORY OF PRESENT ILLNESS:  This is a pleasant 57-year-old man who presented 
to the emergency room at the suggestion of his primary care doctor for right-
sided testicular infection and pain.  He reports that he suddenly had right-
sided pain and swelling that began on Sunday, along with a little bit of blood 
in his urine, but he denied any other lower urinary tract symptoms, 
specifically no dysuria, urgency, frequency.  The patient saw Dr. Juan, was 
given a shot of ceftriaxone in the office, started on oral fluoroquinolones.  
He reports taking 1 Cipro on Monday, 2 Cipro on Tuesday, but at that point 
swelling had increased and patient was advised to come into the emergency room.
  He is denying any left testicular discomfort.  Denies history of diabetes, 
any sexual contacts, constipation, or IV drug use.  No history of kidney stones 
for this patient.  



He does have a medical history significant for recent valve replacement with a 
porcine valve.  That was done approximately 8 weeks ago.  His cardiologist is 
Dr. Markham.  He also has a history of hypertension, hyperlipidemia, and 
paroxysmal AFib, for which he is on Xarelto.  Patient denying nausea, vomiting, 
fever.  At this point, after several days of antibiotics, he reports that his 
pain is significantly improved.



ALLERGIES:  No known drug allergies.



HOME MEDICATIONS:  Include aspirin 81 mg, atorvastatin, calcium, along with 
Xarelto.



PAST MEDICAL HISTORY:  Hypertension, hyperlipidemia, paroxysmal AFib, bicuspid 
aortic valve, S/P repair, congenital coarctation of the aorta.



SURGICAL HISTORY:  Includes the bicuspid aortic valve repair with a porcine 
valve.



FAMILY HISTORY:  Not pertinent.



SOCIAL HISTORY:  Nonsmoker.  No alcohol.  No IV drug use.  Lives with his 
mother and sister.  No sexual contacts.



REVIEW OF SYSTEMS:  10-point review of systems negative except as mentioned in 
HPI.



PHYSICAL EXAMINATION:  GENERAL:  This is a well-developed, well-nourished male, 
in no acute distress.  HEENT:  Normocephalic, atraumatic.  Extraocular 
movements intact.  NECK:  Supple.  No lymphadenopathy.  Trachea midline.  
RESPIRATORY:  No accessory respiratory muscle use.  Normal breath sounds.  
CARDIAC:  Regular rate and rhythm.  No obvious JVD.  GI/ABDOMEN:  Soft, 
nondistended, nontender to palpation.  :  The patient has swelling and 
erythema of the right scrotum.  The patient does have tenderness of the right 
spermatic cord, but otherwise tolerates somewhat robust testicular exam without 
significant complaint.  No left testicular discomfort.  No lymphadenopathy.  
INTEGUMENT:  Erythema of the scrotum.  No other obvious rashes or lesions.  
MUSCULOSKELETAL:  Patient was supine, but moving upper extremities without 
difficulty.  NEURO:  Patient was alert and oriented.  Affect appropriate to 
situation.



LABORATORY DATA:  Patient has a white blood cell count that is currently 11.19, 
trending down from a high of 20 on 04/25.  Hemoglobin 12, hematocrit 38, 
platelets 117.  Lactic acid 1.0.  Chemistry:  Sodium 139, potassium 3.9, 
chloride 109, carbon dioxide 22, BUN 13, creatinine 0.8, glucose 93, calcium 
8.3.  Urinalysis was positive for 5-10 red blood cells, 5-10 white blood cells.
  Culture:  Preliminary urine culture is negative, along with preliminary blood 
culture; however, patient does have a positive urine culture from 04/24/2017, 
positive for E coli which was pansensitive.  Last available PSA on this patient 
0.87 in 2013.



IMAGING:  The patient had a testicular ultrasound on 04/25 showing hyperemic 
right testicle and epididymis, questionable mild hyperemic left testicle, along 
with an anechoic-appearing testicular cyst on the left.  No hydrocele or 
varicocele on the left.  He did have a complex right-sided hydrocele, 3 x 1.7 cm
, with no obvious abscess.  The patient also underwent a CT of the abdomen and 
pelvis, which I personally reviewed, which shows no kidney stones or kidney 
masses.  Interestingly, he does have a 3.8 cm smooth, elliptical, mildly 
hyperdense mass, precoccygeal, with a Hounsfield unit of 52 that does not 
appear to be invading the rectum and without suggestion of abscess.



ASSESSMENT AND PLAN:  Right-sided epididymitis, secondary precoccygeal mass.  
The patient is clinically and based on labs improving from his epididymitis.  
Recommend continued current antibiotics given pansensitive urine culture, along 
with supportive care.  Discussed with the patient the importance of keeping the 
scrotum propped up on the abdomen.  Ideally would be above his heart, although 
this is somewhat difficult to achieve in practice. 



 In regard to the precoccygeal mass, I have discussed this with General Surgery
, Dr. Pollack and Dr Sutton.  Options include repeat imaging, biopsy or removal.  
General Surgery will also discuss options with patient. 





Job #:  [id number]

MTDD

## 2017-04-28 LAB
% IMMATURE GRANULYOCYTES: 1.6 % (ref 0–1.1)
ABSOLUTE IMMATURE GRANULOCYTES: 0.12 10^3/UL (ref 0–0.1)
ABSOLUTE NRBC COUNT: 0 10^3/UL (ref 0–0.01)
ADD DIFF?: NO
ADD MORPH?: NO
ADD SCAN?: NO
ANION GAP SERPL CALC-SCNC: 11 MEQ/L (ref 8–16)
ATYPICAL LYMPHOCYTE FLAG: 0 (ref 0–99)
CALCIUM SERPL-MCNC: 8.3 MG/DL (ref 8.5–10.4)
CHLORIDE SERPL-SCNC: 109 MEQ/L (ref 97–110)
CO2 SERPL-SCNC: 21 MEQ/L (ref 22–31)
CREAT SERPL-MCNC: 0.9 MG/DL (ref 0.7–1.3)
ERYTHROCYTE [DISTWIDTH] IN BLOOD BY AUTOMATED COUNT: 14.6 % (ref 11.5–15.2)
FRAGMENT RBC FLAG: 0 (ref 0–99)
GFR SERPL CREATININE-BSD FRML MDRD: > 60 ML/MIN/{1.73_M2}
GLUCOSE SERPL-MCNC: 91 MG/DL (ref 70–100)
HCT VFR BLD CALC: 38.4 % (ref 40–51)
HGB BLD-MCNC: 12.3 G/DL (ref 13.7–17.5)
LEFT SHIFT FLG: 30 (ref 0–99)
LIPEMIA HEMOLYSIS FLAG: 80 (ref 0–99)
MCH RBC BLDCO QN: 27.6 PG (ref 27.9–34.1)
MCHC RBC AUTO-ENTMCNC: 32 G/DL (ref 32.4–36.7)
MCV RBC AUTO: 86.3 FL (ref 81.5–99.8)
NRBC-AUTO%: 0 % (ref 0–0.2)
PLATELET # BLD: 218 10^3/UL (ref 150–400)
PLATELET CLUMPS FLAG: 0 (ref 0–99)
PMV BLD AUTO: 9.9 FL (ref 8.7–11.7)
POTASSIUM SERPL-SCNC: 3.7 MEQ/L (ref 3.5–5.2)
RBC # BLD AUTO: 4.45 10^6/UL (ref 4.4–6.38)
SODIUM SERPL-SCNC: 141 MEQ/L (ref 134–144)

## 2017-04-28 RX ADMIN — AMIODARONE HYDROCHLORIDE SCH MG: 200 TABLET ORAL at 07:39

## 2017-04-28 RX ADMIN — ATORVASTATIN CALCIUM SCH MG: 20 TABLET, FILM COATED ORAL at 20:10

## 2017-04-28 RX ADMIN — AMIODARONE HYDROCHLORIDE SCH MG: 200 TABLET ORAL at 20:10

## 2017-04-28 RX ADMIN — METOPROLOL TARTRATE SCH MG: 25 TABLET, FILM COATED ORAL at 20:10

## 2017-04-28 RX ADMIN — CLINDAMYCIN PHOSPHATE SCH MLS: 18 INJECTION, SOLUTION INTRAVENOUS at 05:44

## 2017-04-28 RX ADMIN — SODIUM CHLORIDE SCH MLS: 900 INJECTION, SOLUTION INTRAVENOUS at 02:08

## 2017-04-28 RX ADMIN — ASPIRIN 81 MG SCH MG: 81 TABLET ORAL at 07:38

## 2017-04-28 RX ADMIN — RIVAROXABAN SCH MG: 15 TABLET, FILM COATED ORAL at 07:39

## 2017-04-28 RX ADMIN — METOPROLOL TARTRATE SCH MG: 25 TABLET, FILM COATED ORAL at 07:39

## 2017-04-28 NOTE — SOAPPROG
SOAP Progress Note


Assessment/Plan: 


Assessment/Plan:


- 56yo M c incidental precoccygeal mass on CT scan


- he continue to make progress, WBC normal this AM and pain much more 

manageable. 


- Have provided my info for follow up as outpatient. Discussed that will likely 

first get Bx, and if not lymphoma will likely need surgical resection. 


04/28/17 10:05





Subjective: 





Feels better, eating breakfast


Objective: 





 Vital Signs











Temp Pulse Resp BP Pulse Ox


 


 36.8 C   84   20   122/79 H  93 


 


 04/28/17 08:00  04/28/17 08:00  04/28/17 08:00  04/28/17 08:00  04/28/17 08:00








 Laboratory Results





 04/28/17 04:15 





 04/28/17 04:15 





 











 04/27/17 04/28/17 04/29/17





 05:59 05:59 05:59


 


Intake Total 1850 700 


 


Output Total 1820 1975 200


 


Balance 30 -1275 -200








 











PT  22.7 SEC (12.0-15.0)  H  04/26/17  04:15    


 


INR  1.99  (0.83-1.16)  H  04/26/17  04:15    














ICD10 Worksheet


Patient Problems: 


 Problems











Problem Status Onset


 


Epididymitis Acute  


 


Orchitis Acute  


 


Acute blood loss anemia Acute  


 


Coronary artery disease excluded Acute  ~02/22/17


 


S/P aortic valve replacement with stentless valve Acute  ~02/23/17


 


S/P ascending aortic replacement Acute  ~02/23/17


 


Aortic root aneurysm Chronic  


 


Aortic valve stenosis with insufficiency Chronic  


 


Bicuspid aortic valve Chronic

## 2017-04-28 NOTE — SOAPPROG
SOAP Progress Note


Assessment/Plan: 


Assessment:


Acute epididymitis/orchitis




















Plan:


Both in terms of physical exam and labs, patient is improving yet 

radiographically there is concern for developing abscess.  Agree with plan for 

patient to stay in hospital for observation.  Will coordinate care for patient 

to be seen over the weekend. 


04/28/17 14:55





Subjective: 





Minimal testes pain


Objective: 





 Vital Signs











Temp Pulse Resp BP Pulse Ox


 


 36.8 C   84   20   122/79 H  93 


 


 04/28/17 08:00  04/28/17 08:00  04/28/17 08:00  04/28/17 08:00  04/28/17 08:00








 Microbiology











 04/26/17 03:11 Urine Culture - Final





 Urine,Clean Catch 








 Laboratory Results





 04/28/17 04:15 





 04/28/17 04:15 





 











 04/27/17 04/28/17 04/29/17





 05:59 05:59 05:59


 


Intake Total 5188 480 1625


 


Output Total 1820 1975 450


 


Balance 30 -1275 550








 











PT  22.7 SEC (12.0-15.0)  H  04/26/17  04:15    


 


INR  1.99  (0.83-1.16)  H  04/26/17  04:15    














Physical Exam





- Physical Exam


General Appearance: alert, no apparent distress


Respiratory: normal breath sounds


Male Genitalia: other (less swelling, erythema of right testes.  No longer 

tender on exam)





ICD10 Worksheet


Patient Problems: 


 Problems











Problem Status Onset


 


Epididymitis Acute  


 


Orchitis Acute  


 


Acute blood loss anemia Acute  


 


Coronary artery disease excluded Acute  ~02/22/17


 


S/P aortic valve replacement with stentless valve Acute  ~02/23/17


 


S/P ascending aortic replacement Acute  ~02/23/17


 


Aortic root aneurysm Chronic  


 


Aortic valve stenosis with insufficiency Chronic  


 


Bicuspid aortic valve Chronic

## 2017-04-28 NOTE — HOSPPROG
Hospitalist Progress Note


Assessment/Plan: 








# epididymitis: slow improvement; UCx pan-sens e.coli


   - stop clinda, cont rocephin


   - appreciate urology


# pre-coccygeal mass - appreciate Dr Hess


   - outpatient biopsy, will need to hold xarelto


# paroxysmal a-fib: currently NSR


   - cont xarelto, metop, amiodarone


# recent repair of bicuspid AV, hx congenital coarctation of aorta s/p repair


# LBBB





##


reviewed chart


reviewed imaging - CT, US








Subjective: scrotal pain better; no diarrhea/constipation


Objective: 


 Vital Signs











Temp Pulse Resp BP Pulse Ox


 


 36.8 C   84   20   122/79 H  93 


 


 04/28/17 08:00  04/28/17 08:00  04/28/17 08:00  04/28/17 08:00  04/28/17 08:00








 Laboratory Results





 04/28/17 04:15 





 04/28/17 04:15 





 











 04/27/17 04/28/17 04/29/17





 05:59 05:59 05:59


 


Intake Total 1850 700 


 


Output Total 1820 1975 200


 


Balance 30 -1275 -200








 











PT  22.7 SEC (12.0-15.0)  H  04/26/17  04:15    


 


INR  1.99  (0.83-1.16)  H  04/26/17  04:15    














- Physical Exam


Constitutional: no apparent distress


Cardiovascular: regular rate and rhythym, no murmur, rub, or gallop, systolic 

murmur


Respiratory: no respiratory distress, no rales or rhonchi, clear to auscultation


Gastrointestinal: normoactive bowel sounds, soft, non-tender abdomen, no 

palpable masses





ICD10 Worksheet


Patient Problems: 


 Problems











Problem Status Onset


 


Orchitis Acute  


 


Epididymitis Acute  


 


S/P ascending aortic replacement Acute  ~02/23/17


 


Acute blood loss anemia Acute  


 


Aortic root aneurysm Chronic  


 


S/P aortic valve replacement with stentless valve Acute  ~02/23/17


 


Aortic valve stenosis with insufficiency Chronic  


 


Bicuspid aortic valve Chronic  


 


Coronary artery disease excluded Acute  ~02/22/17

## 2017-04-29 VITALS — HEART RATE: 82 BPM

## 2017-04-29 RX ADMIN — AMIODARONE HYDROCHLORIDE SCH MG: 200 TABLET ORAL at 09:09

## 2017-04-29 RX ADMIN — RIVAROXABAN SCH MG: 15 TABLET, FILM COATED ORAL at 09:10

## 2017-04-29 RX ADMIN — ATORVASTATIN CALCIUM SCH MG: 20 TABLET, FILM COATED ORAL at 20:22

## 2017-04-29 RX ADMIN — ASPIRIN 81 MG SCH MG: 81 TABLET ORAL at 09:09

## 2017-04-29 RX ADMIN — METOPROLOL TARTRATE SCH MG: 25 TABLET, FILM COATED ORAL at 09:08

## 2017-04-29 RX ADMIN — METOPROLOL TARTRATE SCH MG: 25 TABLET, FILM COATED ORAL at 20:23

## 2017-04-29 RX ADMIN — AMIODARONE HYDROCHLORIDE SCH MG: 200 TABLET ORAL at 20:22

## 2017-04-29 NOTE — HOSPPROG
Hospitalist Progress Note


Assessment/Plan: 








# R epididymitis: slow improvement; UCx pan-sens e.coli


   - cont rocephin


# pre-coccygeal mass - appreciate Dr Hess


   - outpatient biopsy, will need to hold xarelto


# paroxysmal a-fib: currently NSR


   - cont xarelto, metop, amiodarone


# recent repair of bicuspid AV, hx congenital coarctation of aorta s/p repair


# LBBB





# dispo - likely tomorrow on FQ





##


reviewed chart including Dr Cortes's note








Subjective: feels swelling and redness slightly better


Objective: 


 Vital Signs











Temp Pulse Resp BP Pulse Ox


 


 36.7 C   82   16   117/79   92 


 


 04/29/17 07:46  04/29/17 07:46  04/29/17 07:46  04/29/17 07:46  04/29/17 07:46








 Microbiology











 04/26/17 03:11 Urine Culture - Final





 Urine,Clean Catch 








 Laboratory Results





 04/28/17 04:15 





 04/28/17 04:15 





 











 04/28/17 04/29/17 04/30/17





 05:59 05:59 05:59


 


Intake Total 700 1050 


 


Output Total 1975 1225 


 


Balance -1275 -175 








 











PT  22.7 SEC (12.0-15.0)  H  04/26/17  04:15    


 


INR  1.99  (0.83-1.16)  H  04/26/17  04:15    














- Physical Exam


Constitutional: no apparent distress, appears nourished


Cardiovascular: regular rate and rhythym, no murmur, rub, or gallop


Respiratory: no respiratory distress, no rales or rhonchi, clear to auscultation


Gastrointestinal: normoactive bowel sounds, soft, non-tender abdomen, no 

palpable masses


Genitourinary: other (significant scrotal erythema and edema; R testicle TTP 

and enlarged)





ICD10 Worksheet


Patient Problems: 


 Problems











Problem Status Onset


 


Orchitis Acute  


 


Epididymitis Acute  


 


S/P ascending aortic replacement Acute  ~02/23/17


 


Acute blood loss anemia Acute  


 


Aortic root aneurysm Chronic  


 


S/P aortic valve replacement with stentless valve Acute  ~02/23/17


 


Aortic valve stenosis with insufficiency Chronic  


 


Bicuspid aortic valve Chronic  


 


Coronary artery disease excluded Acute  ~02/22/17

## 2017-04-29 NOTE — SOAPPROG
SOAP Progress Note


Assessment/Plan: 


Assessment:


right epididymo orchitis- continues to clinically improve























Plan:


OK fir discharge on PO abx 


discussed signs to be concerned about- fever, drainage, etc.





f/u 1-2 weeks


04/29/17 08:40





Subjective: 





Feels well.  Pain resolved. No fevers.  No drainage.  Swelling improved.  No 

urinary symptoms.


Objective: 





 Vital Signs











Temp Pulse Resp BP Pulse Ox


 


 36.7 C   82   16   117/79   92 


 


 04/29/17 07:46  04/29/17 07:46  04/29/17 07:46  04/29/17 07:46  04/29/17 07:46








 Microbiology











 04/26/17 03:11 Urine Culture - Final





 Urine,Clean Catch 








 Laboratory Results





 04/28/17 04:15 





 04/28/17 04:15 





 











 04/28/17 04/29/17 04/30/17





 05:59 05:59 05:59


 


Intake Total 700 1050 


 


Output Total 1975 1225 


 


Balance -1275 -175 








 











PT  22.7 SEC (12.0-15.0)  H  04/26/17  04:15    


 


INR  1.99  (0.83-1.16)  H  04/26/17  04:15    














Physical Exam





- Physical Exam


General Appearance: alert, no apparent distress


Respiratory: No respiratory distress


Abdomen: non-tender, soft


Male Genitalia: other (right testicular swelling without tenderness, erythema, 

crepitus, or fluctuance)


Skin: normal color, warm/dry


Neuro/Psych: alert, normal mood/affect, oriented x 3





ICD10 Worksheet


Patient Problems: 


 Problems











Problem Status Onset


 


Epididymitis Acute  


 


Orchitis Acute  


 


Acute blood loss anemia Acute  


 


Coronary artery disease excluded Acute  ~02/22/17


 


S/P aortic valve replacement with stentless valve Acute  ~02/23/17


 


S/P ascending aortic replacement Acute  ~02/23/17


 


Aortic root aneurysm Chronic  


 


Aortic valve stenosis with insufficiency Chronic  


 


Bicuspid aortic valve Chronic

## 2017-04-30 VITALS — OXYGEN SATURATION: 91 %

## 2017-04-30 VITALS — RESPIRATION RATE: 14 BRPM | SYSTOLIC BLOOD PRESSURE: 119 MMHG | TEMPERATURE: 98.3 F | DIASTOLIC BLOOD PRESSURE: 80 MMHG

## 2017-04-30 RX ADMIN — RIVAROXABAN SCH MG: 15 TABLET, FILM COATED ORAL at 08:01

## 2017-04-30 RX ADMIN — AMIODARONE HYDROCHLORIDE SCH MG: 200 TABLET ORAL at 08:02

## 2017-04-30 RX ADMIN — ASPIRIN 81 MG SCH MG: 81 TABLET ORAL at 08:01

## 2017-04-30 RX ADMIN — METOPROLOL TARTRATE SCH MG: 25 TABLET, FILM COATED ORAL at 08:01

## 2017-04-30 NOTE — GDS
[f rep st]



                                                             DISCHARGE SUMMARY





DIAGNOSES:  

1.  Acute right-sided epididymitis/orchitis.

2.  Precoccygeal mass.

3.  Paroxysmal atrial fibrillation.

4.  Recent repair bicuspid aortic valve.

5.  History of congenital coarctation of the aorta.

6.  Left bundle branch block.



HOSPITAL COURSE:  A 57-year-old man who had failed outpatient oral therapy for epididymitis presents
 with the same.  He has been seen by Urology.  Initially treated with Rocephin and clindamycin with 
significant improvement.  His clindamycin was discontinued 2 days prior to discharge.  On discharge,
 he will be given a somewhat prolonged course of Levaquin.  He had an ultrasound showing possible ea
rly abscess though he has had significant clinical improvement.  I have warned him about side affect
s of levofloxacin.  He will follow up next week with either Amy Harrell or Dr. Sutton.  They can 
help further guide antibiotic duration though I have given him a total of 30 days.  Hopefully, he wi
ll not need that long.  He had an outpatient urine culture which showed pansensitive E coli. 



He had an incidental precoccygeal mass seen on CT scan.  Dr. Hess has been guarding therapy on
 that.  Will likely need an outpatient biopsy.  If that is unsuccessful we will need a likely laparo
scopy.  He is currently on Xarelto which will need to be held.  He is also on aspirin. 



He had a recent repair of bicuspid aortic valve and also a long-term history of a congenital coarcta
tion of the aorta status post repair.  Also has a left bundle branch block.  Cardiac issues have bee
n quiescent during this hospitalization.



BILLING:  I spent more than 30 minutes on the day of discharge coordinating care.





Job #:  [id number]

## 2017-08-07 NOTE — GHP
[f rep st]



                                                       PREOP HISTORY AND PHYSICAL





DATE OF ADMISSION:  08/08/2017



DATE OF SURGERY:  08/08/2017



CHIEF COMPLAINT:  Anterior coccygeal mass.



HISTORY OF PRESENT ILLNESS:  This is a 57-year-old male who I was originally asked to see while mitch maldonado hospitalized for some scrotal and perineal cellulitis.  At that point in time, he had a CT scan do
ne of the area which showed this 2 x 3 cm anterior coccygeal mass.  At that time, the patient had an
 underlying infection and this was incidentally found.  The decision was made to not proceed with oneal
rgery as it was asymptomatic.  The patient has subsequently recovered from that cellulitis and finis
hed his IV antibiotic therapy.  He returns for followup and wants it removed after discussion that 5
0% of these can be malignant tumors although he is not displaying any signs of that at this point in
 time.  He denies having any symptoms from this.  His bowel movements were regular and normal.  He f
eels nothing back there but wants the peace of mind knowing that it can be removed.  He denies fever
s and chills and otherwise feels well.



PAST MEDICAL HISTORY:  Significant for hypertension, thoracic aortic aneurysm, bicuspid aortic valve
 status post AVR, hyperlipidemia and chronic knee pain.



PAST SURGICAL HISTORY:  Includes aortic valve replacement on chronic anticoagulant therapy.



REVIEW OF SYSTEMS:  A full 10-point review was performed and, unless explicitly stated above, is oth
erwise negative.



CURRENT MEDICATIONS:  Include amiodarone, atorvastatin, Xarelto, metoprolol and aspirin.



PHYSICAL EXAM:  VITAL SIGNS:  Reviewed and normal.  GENERAL:  Pleasant, well nourished, well groomed
.  EARS:  Normal hearing.  Mucous membranes moist.  Extraocular movements intact.  NECK:  Soft, with
out masses.  CHEST:  Clear to auscultation bilaterally.  CARDIOVASCULAR:  You can hear the click fro
m his aortic valve, but no appreciable murmurs.  ABDOMEN:  Soft, nondistended.  RECTAL:  Exam shows 
a posterior mass which is mobile, nontender, and firm.  Does not appear to be fixed to the rectum.  
EXTREMITIES:  Warm and well perfused.



IMAGING:  Includes a CT scan which was done at his recent hospitalization shows an approximate 2 x 3
 cm anterior coccygeal mass not associated with the rectum.



ASSESSMENT AND PLAN:  A 57-year-old male with anterior coccygeal mass.  I discussed with him the ris
ks, benefits, and alternatives to procedure.  We will plan to proceed to the operating room for a Kr
aske procedure to successfully remove the anterior coccygeal mass.  The patient understands that he 
will more than likely stay in the hospital at least 1 night and that he will be able to start his bl
ood thinners typically 1 day after surgery.  He understands that he will hold his Xarelto prior to s
urgery and has received cardiac clearance in order to do so.





Job #:  [id number]

## 2017-08-08 ENCOUNTER — HOSPITAL ENCOUNTER (INPATIENT)
Dept: HOSPITAL 80 - F3E | Age: 57
LOS: 1 days | Discharge: HOME | DRG: 983 | End: 2017-08-09
Attending: SURGERY | Admitting: SURGERY
Payer: COMMERCIAL

## 2017-08-08 DIAGNOSIS — L72.0: Primary | ICD-10-CM

## 2017-08-08 DIAGNOSIS — Z95.2: ICD-10-CM

## 2017-08-08 DIAGNOSIS — Z79.01: ICD-10-CM

## 2017-08-08 DIAGNOSIS — I10: ICD-10-CM

## 2017-08-08 DIAGNOSIS — Z12.11: ICD-10-CM

## 2017-08-08 DIAGNOSIS — I71.2: ICD-10-CM

## 2017-08-08 DIAGNOSIS — E78.5: ICD-10-CM

## 2017-08-08 DIAGNOSIS — M25.569: ICD-10-CM

## 2017-08-08 LAB
% IMMATURE GRANULYOCYTES: 0.4 % (ref 0–1.1)
ABSOLUTE IMMATURE GRANULOCYTES: 0.03 10^3/UL (ref 0–0.1)
ABSOLUTE NRBC COUNT: 0 10^3/UL (ref 0–0.01)
ADD DIFF?: NO
ADD MORPH?: NO
ADD SCAN?: NO
ANION GAP SERPL CALC-SCNC: 12 MEQ/L (ref 8–16)
APTT BLD: 33.7 SEC (ref 23–38)
ATYPICAL LYMPHOCYTE FLAG: 10 (ref 0–99)
CALCIUM SERPL-MCNC: 9.3 MG/DL (ref 8.5–10.4)
CHLORIDE SERPL-SCNC: 107 MEQ/L (ref 97–110)
CO2 SERPL-SCNC: 21 MEQ/L (ref 22–31)
CREAT SERPL-MCNC: 1.2 MG/DL (ref 0.7–1.3)
ERYTHROCYTE [DISTWIDTH] IN BLOOD BY AUTOMATED COUNT: 16.2 % (ref 11.5–15.2)
FRAGMENT RBC FLAG: 0 (ref 0–99)
GFR SERPL CREATININE-BSD FRML MDRD: > 60 ML/MIN/{1.73_M2}
GLUCOSE SERPL-MCNC: 81 MG/DL (ref 70–100)
HCT VFR BLD CALC: 46.3 % (ref 40–51)
HGB BLD-MCNC: 15 G/DL (ref 13.7–17.5)
INR PPP: 1.17 (ref 0.83–1.16)
LEFT SHIFT FLG: 0 (ref 0–99)
LIPEMIA HEMOLYSIS FLAG: 80 (ref 0–99)
MCH RBC BLDCO QN: 29 PG (ref 27.9–34.1)
MCHC RBC AUTO-ENTMCNC: 32.4 G/DL (ref 32.4–36.7)
MCV RBC AUTO: 89.4 FL (ref 81.5–99.8)
NRBC-AUTO%: 0 % (ref 0–0.2)
PLATELET # BLD: 146 10^3/UL (ref 150–400)
PLATELET CLUMPS FLAG: 0 (ref 0–99)
PMV BLD AUTO: 9.6 FL (ref 8.7–11.7)
POTASSIUM SERPL-SCNC: 3.8 MEQ/L (ref 3.5–5.2)
PROTHROMBIN TIME: 14.9 SEC (ref 12–15)
RBC # BLD AUTO: 5.18 10^6/UL (ref 4.4–6.38)
SODIUM SERPL-SCNC: 140 MEQ/L (ref 134–144)

## 2017-08-08 PROCEDURE — 0JB70ZX EXCISION OF BACK SUBCUTANEOUS TISSUE AND FASCIA, OPEN APPROACH, DIAGNOSTIC: ICD-10-PCS | Performed by: SURGERY

## 2017-08-08 PROCEDURE — 0QTS0ZZ RESECTION OF COCCYX, OPEN APPROACH: ICD-10-PCS | Performed by: SURGERY

## 2017-08-08 PROCEDURE — 0DJD8ZZ INSPECTION OF LOWER INTESTINAL TRACT, VIA NATURAL OR ARTIFICIAL OPENING ENDOSCOPIC: ICD-10-PCS | Performed by: SURGERY

## 2017-08-08 RX ADMIN — METOPROLOL TARTRATE SCH: 50 TABLET, FILM COATED ORAL at 20:05

## 2017-08-08 NOTE — GOP
[f 
rep st]



                                                                OPERATIVE REPORT





DATE OF OPERATION:  08/08/2017



SURGEON:  Sandro Hess MD



ASSISTANT:  Pepito Christie MD



ANESTHESIA:  General endotracheal.



PREOPERATIVE DIAGNOSIS:  Anterior coccygeal mass.



POSTOPERATIVE DIAGNOSIS:  Anterior coccygeal mass.



PROCEDURE PERFORMED:  Kraske procedure for resection of anterior coccygeal mass 
and coccygectomy.



FINDINGS:  Mass appeared to be filled with anchovy-type paste brown material.  
The material and the overlying cyst capsule were removed completely.  
Hemostasis was achieved with a combination of gentle pressure and 
electrocautery.



SPECIMENS:  

1.  Coccyx.

2.  Anterior coccygeal mass.



DRAINS:  None.



ESTIMATED BLOOD LOSS:  10 cc.



DESCRIPTION OF PROCEDURE:  The patient was greeted in the preoperative suite.  
Once again, risks, benefits, and alternatives were discussed.  He was then 
brought back to the operative suite.  After a World Health Organization time-
out was performed, general endotracheal anesthesia was then induced.  The 
patient first underwent colonoscopy.  Please see separate dictation for this.  
After successful colonoscopy, the patient was then placed in a prone teri-knife 
position on the operative table with all pressure points appropriately padded.  
His posterior anal and rectal area were prepped and draped in typical sterile 
fashion.  



After prepping and draping, a 5 cm incision was made vertically and carried 
down through the subcutaneous tissue, where the sacrum and coccyx were 
identified.  This was carried down inferiorly.  The edges of the coccyx were 
completely freed up, and using a rongeur, the coccyx was completely freed from 
the remainder of the sacrum.  Just deep to this, the mass was palpated.  The 
mass appeared to be filled with gelatinous-type brown material and was 
completely removed.  It was dissected from the underlying tissue taking great 
care to not injure the rectum.  The rectum was identified and protected 
throughout this.  After complete removal, hemostasis was achieved with a 
combination of gentle pressure and electrocautery.  The underlying pelvic floor 
muscles, which were cut, were reapproximated with interrupted Vicryl sutures.  
The wound was then closed in layers using 0, 2-0, and 3-0 Vicryl sutures, and 
the skin was closed with a running 4-0 Monocryl, over which Dermabond was 
placed.  The patient was then put back onto the [facility] in a supine position and 
extubated, and taken to the PACU in satisfactory condition.



COUNTS:  All counts were reported as correct x2.





Job #:  [id number]

MTDD

## 2017-08-08 NOTE — POSTANESTH
Post Anesthetic Evaluation


Cardiovascular Status: Normal, Stable


Respiratory Status: Normal, Stable


Level of Consciousness/Mental Status: Can Participate in Eval, Alert and 

Oriented


Pain Control: Adequate, Prn Tx Ordered


Nausea/Vomiting Control: Adequate, Prn Tx Ordered


Complications Possibly Related to Anesthesia: None Noted

## 2017-08-08 NOTE — POSTOPPROG
Post Op Note


Date of Operation: 08/08/17


Surgeon: Sandro Hess


Assistant: BEVERLY Christie MD


Anesthesiologist: Marlo


Anesthesia: GET(General Endotracheal)


Pre-op Diagnosis: anterior coccygeal mass


Post-op Diagnosis: same


Procedure: Kraske exposure with coccygectomy and removal anterior coccygeal mass


Findings: coccyx removed, rectum protected, mass excised in whole 


Inf/Abcess present in the surg proc area at time of surgery?: No


EBL: Minimal


Specimen(s): 





anterior coccygeal mass with coccyx

## 2017-08-08 NOTE — PDANEPAE
ANE History of Present Illness





Excision Coccygeal Mass





ANE Past Medical History





- Cardiovascular History


Hx Hypertension: Yes


Hx Arrhythmias: No


Hx Chest Pain: No


Hx Coronary Artery / Peripheral Vascular Disease: No


Hx CHF / Valvular Disease: No


Hx Palpitations: No


Cardiovascular History Comment: AORTIC VALVE REPAIR 3/2017.  SINGLE BOUT OF A-

FIB POST PLACED ON RX NONE SINCE





- Pulmonary History


Hx COPD: No


Hx Asthma/Reactive Airway Disease: No


Hx Recent Upper Respiratory Infection: No


Hx Oxygen in Use at Home: No


Hx Sleep Apnea: No


Sleep Apnea Screening Result - Last Documented: Positive





- Neurologic History


Hx Cerebrovascular Accident: No


Hx Seizures: No


Hx Dementia: No





- Endocrine History


Hx Diabetes: No





- Renal History


Hx Renal Disorders: No





- Liver History


Hx Hepatic Disorders: No





- Neurological & Psychiatric Hx


Hx Neurological and Psychiatric Disorders: No





- Cancer History


Hx Cancer: No





- Congenital Disorder History


Hx Congenital Disorders: No





- GI History


Hx Gastrointestinal Disorders: Yes


Gastrointestinal History Comment: HEMORRHOIDS.  PERINEUM MASS





- Other Health History


Other Health History: JEFFY ORCHITIS,EPIDIDYMITIS





- Chronic Pain History


Chronic Pain: No





- Surgical History


Prior Surgeries: AORTIC VALVE/ANEURSYM 3/2017.  AGE 10 HEART AORTIC COARCTATION





ANE Review of Systems


Review of systems is: negative





- Exercise capacity


METS (RN): 4 METS





ANE Patient History





- Allergies


Allergies/Adverse Reactions: 








No Known Allergies Allergy (Unverified 06/14/10 14:02)


 








- Home Medications


Home Medications: 








Aspirin [Aspirin 81mg (*)] 81 mg PO DAILY 06/14/10 [Last Taken 1 Week Ago]


Atorvastatin Calcium [Lipitor 20 mg (*)] 20 mg PO HS 06/14/10 [Last Taken 08/07/ 17 21:00]


Rivaroxaban [Xarelto 15mg (*)] 15 mg PO HS 04/26/17 [Last Taken 08/05/17 08:00]


Amiodarone HCl [Pacerone (*)] 200 mg PO DAILY 07/26/17 [Last Taken 08/08/17 06:

00]


Metoprolol Tartrate [Lopressor 50 mg (*)] 50 mg PO BID 07/26/17 [Last Taken 08/ 08/17 06:00]


Enoxaparin [Lovenox 100 MG (*)] 100 mg SQ ONCE 08/08/17 [Last Taken 08/07/17 22:

00]








- NPO status


NPO Since - Liquids (Date): 08/07/17


NPO Since - Liquids (Time): 23:30


NPO Since - Solids (Date): 08/07/17


NPO Since - Solids (Time): 23:30





- Smoking Hx


Smoking Status: Never smoked





ANE Labs/Vital Signs





- Labs


Result Diagrams: 


 08/08/17 09:53





 08/08/17 09:53





- Vital Signs


Blood Pressure: 128/91


Heart Rate: 65


Respiratory Rate: 18


O2 Sat (%): 91


Height: 186.69 cm


Weight: 99.79 kg





ANE Physical Exam





- Airway


Neck exam: FROM


Mallampati Score: Class 3


Mouth exam: normal dental/mouth exam, small mouth opening





- Pulmonary


Pulmonary: clear to auscultation





- Cardiovascular


Cardiovascular: regular rate and rhythym





- ASA Status


ASA Status: III





ANE Anesthesia Plan


Anesthesia Plan: general endotracheal anesthesia

## 2017-08-08 NOTE — GOP
[f 
rep st]



                                                                OPERATIVE REPORT





DATE OF OPERATION:  



SURGEON:  Sandro Hess MD



ASSISTANT:  None.



ANESTHESIA:  General endotracheal.



ANESTHESIOLOGIST:  Michael Sellers DO



PREOPERATIVE DIAGNOSIS:  Screening colonoscopy after failed FIT test.



POSTOPERATIVE DIAGNOSIS:  Normal colonoscopy.



PROCEDURE PERFORMED:  Colonoscopy.



FINDINGS: Normal colonoscopy



INDICATIONS:  A 57-year-old male, who had a positive FIT test here for 
screening colonoscopy.



DESCRIPTION OF PROCEDURE:  Physical examination was performed.  The major risks 
and benefits associated with the procedure were explained to the patient and 
his family in detail.  He was subsequently brought into the operating room.  
After a World Health Organization time-out was performed, general endotracheal 
anesthesia was induced without incident.  The patient was then placed in the 
left lateral decubitus position and a digital rectal exam was performed.  Aside 
from some external skin tags, the examination was within normal limits.  There 
was a posterior mass to the rectum, which was digitized on exam consistent with 
preoperative imaging.  



A well-lubricated colonoscope was then inserted into the rectum and advanced 
under direct visualization to the level of the cecum.  The cecum was identified 
both by visual and anatomic landmarks, including the ileocecal valve and 
appendiceal lumen.  A photograph was taken of this.  The scope was then fully 
withdrawn over a period of greater than 8 minutes while examining the color, 
texture, anatomy, and integrity of the mucosa of the cecum and anal canal.  The 
findings were consistent with normal colonic mucosa.  The scope was then 
completely retrieved upon exiting the anal canal and the procedure was 
terminated.  Prior to this, I did perform a retroflexed view of the external 
anal opening, which was normal.  The patient tolerated the procedure well with 
no intraprocedural complications.



INSTRUMENT USED:  Adult Olympus colonoscope.



EXTENT OF EXAM:  To the cecum.



PREPARATION:  Fair.



ENDOSCOPIC DIAGNOSIS:  Normal colonoscopy.



RECOMMENDATIONS:  Follow up for repeat screening in 10 years.





Job #:  [id number]

MTDD

## 2017-08-08 NOTE — POSTOPPROG
Post Op Note


Date of Operation: 08/08/17


Surgeon: Sandro Hess


Anesthesiologist: Malro


Anesthesia: GET(General Endotracheal)


Pre-op Diagnosis: screening colonoscopy


Post-op Diagnosis: same


Procedure: colonoscopy


Findings: no lesions, > 8minute withdrawal time 


Inf/Abcess present in the surg proc area at time of surgery?: No


EBL: Minimal

## 2017-08-09 VITALS — OXYGEN SATURATION: 92 %

## 2017-08-09 VITALS — TEMPERATURE: 98.2 F | SYSTOLIC BLOOD PRESSURE: 101 MMHG | DIASTOLIC BLOOD PRESSURE: 66 MMHG | HEART RATE: 62 BPM

## 2017-08-09 VITALS — RESPIRATION RATE: 18 BRPM

## 2017-08-09 RX ADMIN — METOPROLOL TARTRATE SCH MG: 50 TABLET, FILM COATED ORAL at 08:49

## 2017-08-09 RX ADMIN — ASPIRIN 81 MG SCH MG: 81 TABLET ORAL at 08:49

## 2017-08-09 RX ADMIN — ASPIRIN 81 MG SCH MG: 81 TABLET ORAL at 08:54

## 2017-08-09 NOTE — SOAPPROG
SOAP Progress Note


Assessment/Plan: 


Assessment/Plan: 57 Y M POD#1 s/p coccygeal resection and colonoscopy. 





Doing well. 


D/c to home today. 





S: pain controlled. tolerating diet. no n/v. passing flatus. 


O:


alert, nad


mmm


chest clear


rrr


abd soft


inc cdi c skin glue, minimal ecchymosis and swelling. 





08/09/17 10:43





Objective: 





 Vital Signs











Temp Pulse Resp BP Pulse Ox


 


 37.1 C   78   18   106/63   91 L


 


 08/09/17 08:50  08/09/17 08:50  08/09/17 08:50  08/09/17 08:50  08/09/17 08:50








 Laboratory Results





 08/08/17 09:53 





 08/08/17 09:53 





 











 08/08/17 08/09/17 08/10/17





 05:59 05:59 05:59


 


Intake Total  1230 


 


Output Total  500 


 


Balance  730 








 











PT  14.9 SEC (12.0-15.0)   08/08/17  09:53    


 


INR  1.17  (0.83-1.16)  H  08/08/17  09:53    














ICD10 Worksheet


Patient Problems: 


 Problems











Problem Status Onset


 


Acute blood loss anemia Acute  


 


Coronary artery disease excluded Acute  ~02/22/17


 


Epididymitis Acute  


 


Orchitis Acute  


 


S/P aortic valve replacement with stentless valve Acute  ~02/23/17


 


S/P ascending aortic replacement Acute  ~02/23/17


 


Aortic root aneurysm Chronic  


 


Aortic valve stenosis with insufficiency Chronic  


 


Bicuspid aortic valve Chronic

## 2017-08-21 ENCOUNTER — HOSPITAL ENCOUNTER (EMERGENCY)
Dept: HOSPITAL 80 - FED | Age: 57
Discharge: HOME | End: 2017-08-21
Payer: COMMERCIAL

## 2017-08-21 VITALS
OXYGEN SATURATION: 98 % | SYSTOLIC BLOOD PRESSURE: 121 MMHG | TEMPERATURE: 98.2 F | DIASTOLIC BLOOD PRESSURE: 74 MMHG | HEART RATE: 75 BPM | RESPIRATION RATE: 16 BRPM

## 2017-08-21 DIAGNOSIS — R06.00: Primary | ICD-10-CM

## 2017-08-21 DIAGNOSIS — Z79.82: ICD-10-CM

## 2017-08-21 DIAGNOSIS — R04.0: ICD-10-CM

## 2017-08-21 DIAGNOSIS — Z79.01: ICD-10-CM

## 2017-08-21 LAB
% IMMATURE GRANULYOCYTES: 0.8 % (ref 0–1.1)
ABSOLUTE IMMATURE GRANULOCYTES: 0.08 10^3/UL (ref 0–0.1)
ABSOLUTE NRBC COUNT: 0 10^3/UL (ref 0–0.01)
ADD DIFF?: NO
ADD MORPH?: NO
ADD SCAN?: NO
ANION GAP SERPL CALC-SCNC: 11 MEQ/L (ref 8–16)
ATYPICAL LYMPHOCYTE FLAG: 30 (ref 0–99)
CALCIUM SERPL-MCNC: 9 MG/DL (ref 8.5–10.4)
CHLORIDE SERPL-SCNC: 106 MEQ/L (ref 97–110)
CO2 SERPL-SCNC: 23 MEQ/L (ref 22–31)
CREAT SERPL-MCNC: 1 MG/DL (ref 0.7–1.3)
ERYTHROCYTE [DISTWIDTH] IN BLOOD BY AUTOMATED COUNT: 14.8 % (ref 11.5–15.2)
FRAGMENT RBC FLAG: 0 (ref 0–99)
GFR SERPL CREATININE-BSD FRML MDRD: > 60 ML/MIN/{1.73_M2}
GLUCOSE SERPL-MCNC: 98 MG/DL (ref 70–100)
HCT VFR BLD CALC: 43.2 % (ref 40–51)
HGB BLD-MCNC: 14.1 G/DL (ref 13.7–17.5)
LEFT SHIFT FLG: 0 (ref 0–99)
LIPEMIA HEMOLYSIS FLAG: 80 (ref 0–99)
MCH RBC BLDCO QN: 29.2 PG (ref 27.9–34.1)
MCHC RBC AUTO-ENTMCNC: 32.6 G/DL (ref 32.4–36.7)
MCV RBC AUTO: 89.4 FL (ref 81.5–99.8)
NRBC-AUTO%: 0 % (ref 0–0.2)
PLATELET # BLD: 239 10^3/UL (ref 150–400)
PLATELET CLUMPS FLAG: 0 (ref 0–99)
PMV BLD AUTO: 9.7 FL (ref 8.7–11.7)
POTASSIUM SERPL-SCNC: 3.6 MEQ/L (ref 3.5–5.2)
RBC # BLD AUTO: 4.83 10^6/UL (ref 4.4–6.38)
SODIUM SERPL-SCNC: 140 MEQ/L (ref 134–144)

## 2017-08-21 NOTE — EDPHY
H & P


Smoking Status: Never smoked


Time Seen by Provider: 08/21/17 16:12


HPI/ROS: 





CHIEF COMPLAINT:  Epistaxis, shortness of breath





HISTORY OF PRESENT ILLNESS:  57-year-old male presents to the emergency 

department by private vehicle complaining of epistaxis that began yesterday.  

It has been intermittent throughout the day although he states that he slept 

fine last night.  Continued again today.  Patient has been taking Xarelto since 

February after he had heart surgery and this is the 1st episode of epistaxis 

that he has had since taking this medication.  Also states that he is feeling 

short of breath.  He has no pain in his chest.  No headache.  Also has a cyst 

on the buttock that was surgically removed by Dr. Hess and the patient is 

still undergoing wound treatment for this.  He has no fevers or chills. He 

thought that he had some swelling in his right lower leg.  No pain in his leg.  

No symptoms in the left lower extremity.  No abdominal pain.  His epistaxis has 

now since resolved since arrival in the emergency department.





REVIEW OF SYSTEMS:


Constitutional:  No fever, no chills.


Eyes:  No double or blurry vision.


ENT:  Epistaxis as above.  No sore throat.


Respiratory:  Shortness of breath. No cough.


Cardiac:  No chest pain.


Gastrointestinal:  No abdominal pain, vomiting or diarrhea.


Genitourinary:  No dysuria.


Musculoskeletal:  No neck or back pain.


Skin:  No rashes.


Neurological:  No headache. (Minna Jim)


Past Medical/Surgical History: 





Aortic coarctation repair 1970 and again in February of 2017 when he developed 

aortic root dilatation with aortic insufficiency, aortic root replacement, 

valve replacement, atrial fibrillation (Minna Jim)


Social History: 





Single and lives in [place] (Minna Jim)


Physical Exam: 





General Appearance:  Alert, no distress. Vital signs are stable.


Eyes:  Pupils equal and round.  Extraocular motions are all intact.


ENT:  Mouth:  Mucous membranes moist. No bleeding currently from his nose. The 

right side is clear. The left side has some dried blood specially to the 

anterior aspect of the nose over lying Kiesselbach's triangle.  No postnasal 

drip or blood in the posterior pharynx.


Respiratory:  No wheezing, rhonchi, or rales, lungs are clear to auscultation.


Cardiovascular:  Regular rate and rhythm.


Gastrointestinal:  Abdomen is soft and nontender, no masses, no rebound or 

guarding, bowel sounds normal.


Neurological:  Alert and oriented x 3, cranial nerves II through XII grossly 

intact


Skin:  Warm and dry, no rashes.


Musculoskeletal:  Nontender to palpate along the cervical, thoracic or lumbar 

spine.  Neck is supple.


Extremities:  Full range of motion and no peripheral edema. Nontender to 

palpate lower extremities. No pedal edema noted.


Psychiatric:  Patient is oriented X 3, there is no agitation. (Minna Jim)


Constitutional: 


 Initial Vital Signs











Temperature (C)  36.7 C   08/21/17 15:16


 


Heart Rate  70   08/21/17 15:16


 


Respiratory Rate  18   08/21/17 15:16


 


Blood Pressure  120/79   08/21/17 15:16


 


O2 Sat (%)  96   08/21/17 15:16








 











O2 Delivery Mode               Room Air














Allergies/Adverse Reactions: 


 





No Known Allergies Allergy (Verified 08/21/17 15:16)


 








Home Medications: 














 Medication  Instructions  Recorded


 


Aspirin [Aspirin 81mg (*)] 81 mg PO DAILY 06/14/10


 


Atorvastatin Calcium [Lipitor 20 20 mg PO HS 06/14/10





mg (*)]  


 


Acetaminophen [Tylenol 325mg (*)] 325 - 650 mg PO Q4HRS PRN #0 tab 03/01/17


 


Rivaroxaban [Xarelto 15mg (*)] 15 mg PO HS 04/26/17


 


Amiodarone HCl [Pacerone (*)] 200 mg PO DAILY 07/26/17


 


Metoprolol Tartrate [Lopressor 50 50 mg PO BID 07/26/17





mg (*)]  


 


Hydrocodone/APAP 5/325 [Norco 1 - 2 tab PO Q4HRS PRN #30 tab 08/09/17





5/325 (*)]  


 


Ibuprofen [Motrin (*)] 600 mg PO Q8HRS PRN #0 tab 08/09/17














Medical Decision Making





- Diagnostics


Imaging: Discussed imaging studies w/ On call Radiologist, I viewed and 

interpreted images myself





- Diagnostics


EKG Interpretation: 





12-lead EKG interpreted by me; official reading is in trace master.  My 

interpretation is sinus rhythm with left bundle branch block rate 71


Previous EKG LBBB on 4/26/17. (Gabino oLza)


Imaging Results: 


 Imaging Impressions





Chest X-Ray  08/21/17 16:27


Impression:  Improved reticular nodular opacities bilateral lower lobes, 

probably old postinfectious/postinflammatory changes, without definite acute 

pneumonia.


 


Findings and recommendations discussed with Emergency Department, Minna Jim PA-C, at 1746 hours, on August 21, 2017.


 


Final report concurs with initial preliminary interpretation.











ED Course/Re-evaluation: 





57-year-old male presents to the emergency department with history of epistaxis 

that has been intermittent since yesterday.  He was monitored for over 3 hours 

in the emergency department and did not have any recurring epistaxis.  He had 

no blood in the posterior pharynx.  A small amount of KY jelly was applied to 

the left anterior aspect of the nose to help prevent further bleeding.





Patient is also complaining of feeling mildly short of breath.  He is on 

Xarelto.  He had repair of aortic root dilatation from previous aortic 

coarctation.  Surgery was the 2/22/2017.  At that time he had a catheterization 

which revealed clean coronary arteries.





EKG was reviewed by Dr. Gabino Loza revealing no acute ST T wave abnormalities.





Chest x-ray was obtained which was reviewed from his previous study of March 30th 2017 and after discussing this with the radiologist, the radiologist felt 

that the chest x-ray is improved from 3/30/2017.  I doubt this patient has 

pneumonia.  He has no subjective cough or other URI symptoms.  He has no fever.





CBC and chemistries are within normal limits. The patient has no signs of 

anemia specifically.





I do not think further testing is necessary.  The patient is comfortable being 

discharged home.  I encouraged close follow-up with primary care provider.  He 

was encouraged to return if he developed chest pain, worsening shortness of 

breath, or if he felt worse in any way.





The case was discussed with Dr. Gabino Loza, secondary supervising physician, who 

did not directly evaluate the patient but agrees with treatment and plan.  He 

is aware that the patient had cardiac catheterization on 2/22/2017 and at that 

time had clean coronaries.  Troponin was not ordered in the emergency 

department.  The patient has no chest pain. He denies pleuritic chest pain. I 

doubt pulmonary embolism.  The patient is on Xarelto and is compliant with this 

medication. (Minna Jim)


Differential Diagnosis: 





Shortness of breath including but not limited to pulmonary infectious process, 

COPD, asthma, pulmonary embolus and congestive heart failure. (Minna Jim)





- Data Points


Laboratory Results: 


 Laboratory Results





 08/21/17 17:24 





 08/21/17 17:24 





 











  08/21/17 08/21/17





  17:24 17:24


 


WBC    9.90 10^3/uL H 10^3/uL





    (3.80-9.50) 


 


RBC    4.83 10^6/uL 10^6/uL





    (4.40-6.38) 


 


Hgb    14.1 g/dL g/dL





    (13.7-17.5) 


 


Hct    43.2 % %





    (40.0-51.0) 


 


MCV    89.4 fL fL





    (81.5-99.8) 


 


MCH    29.2 pg pg





    (27.9-34.1) 


 


MCHC    32.6 g/dL g/dL





    (32.4-36.7) 


 


RDW    14.8 % %





    (11.5-15.2) 


 


Plt Count    239 10^3/uL 10^3/uL





    (150-400) 


 


MPV    9.7 fL fL





    (8.7-11.7) 


 


Neut % (Auto)    68.6 % %





    (39.3-74.2) 


 


Lymph % (Auto)    20.4 % %





    (15.0-45.0) 


 


Mono % (Auto)    8.6 % %





    (4.5-13.0) 


 


Eos % (Auto)    1.0 % %





    (0.6-7.6) 


 


Baso % (Auto)    0.6 % %





    (0.3-1.7) 


 


Nucleat RBC Rel Count    0.0 % %





    (0.0-0.2) 


 


Absolute Neuts (auto)    6.79 10^3/uL H 10^3/uL





    (1.70-6.50) 


 


Absolute Lymphs (auto)    2.02 10^3/uL 10^3/uL





    (1.00-3.00) 


 


Absolute Monos (auto)    0.85 10^3/uL H 10^3/uL





    (0.30-0.80) 


 


Absolute Eos (auto)    0.10 10^3/uL 10^3/uL





    (0.03-0.40) 


 


Absolute Basos (auto)    0.06 10^3/uL 10^3/uL





    (0.02-0.10) 


 


Absolute Nucleated RBC    0.00 10^3/uL 10^3/uL





    (0-0.01) 


 


Immature Gran %    0.8 % %





    (0.0-1.1) 


 


Immature Gran #    0.08 10^3/uL 10^3/uL





    (0.00-0.10) 


 


Sodium  140 mEq/L mEq/L  





   (134-144)  


 


Potassium  3.6 mEq/L mEq/L  





   (3.5-5.2)  


 


Chloride  106 mEq/L mEq/L  





   ()  


 


Carbon Dioxide  23 mEq/l mEq/l  





   (22-31)  


 


Anion Gap  11 mEq/L mEq/L  





   (8-16)  


 


BUN  13 mg/dL mg/dL  





   (7-23)  


 


Creatinine  1.0 mg/dL mg/dL  





   (0.7-1.3)  


 


Estimated GFR  > 60   





   


 


Glucose  98 mg/dL mg/dL  





   ()  


 


Calcium  9.0 mg/dL mg/dL  





   (8.5-10.4)  














Departure





- Departure


Disposition: Home, Routine, Self-Care


Clinical Impression: 


 Acute anterior epistaxis





Dyspnea


Qualifiers:


 Dyspnea type: unspecified Qualified Code(s): R06.00 - Dyspnea, unspecified





Condition: Good


Instructions:  Nosebleed (ED), Dyspnea (ED)


Additional Instructions: 


Try applying a nasal lubricant to your nose to prevent dryness and recurring 

bleeding.  Please return to the emergency department if you developed recurring 

nosebleeds the have difficulty stopping.





You should follow up with her primary care provider this week regarding your 

ongoing shortness of breath.  Your EKG is unremarkable. Your chest x-ray has 

improved since March of 2017.


Referrals: 


Titi Faulkner MD [Primary Care Provider] - As per Instructions

## 2017-08-21 NOTE — CPEKG
Heart Rate: 71

RR Interval: 845

P-R Interval: 180

QRSD Interval: 136

QT Interval: 468

QTC Interval: 509

P Axis: 26

QRS Axis: -38

T Wave Axis: 90

EKG Severity - ABNORMAL ECG -

EKG Impression: SINUS RHYTHM

EKG Impression: LEFT BUNDLE BRANCH BLOCK

Electronically Signed By: Gabino Loza 21-Aug-2017 17:06:51

## 2018-04-30 ENCOUNTER — HOSPITAL ENCOUNTER (EMERGENCY)
Dept: HOSPITAL 80 - FED | Age: 58
Discharge: HOME | End: 2018-04-30
Payer: COMMERCIAL

## 2018-04-30 VITALS — SYSTOLIC BLOOD PRESSURE: 124 MMHG | DIASTOLIC BLOOD PRESSURE: 75 MMHG

## 2018-04-30 DIAGNOSIS — Z79.82: ICD-10-CM
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
## 2018-04-30 DIAGNOSIS — L05.91: Primary | ICD-10-CM

## 2018-04-30 NOTE — EDPHY
H & P


Stated Complaint: bleeding from old surgical site-coccyx mass removal


Time Seen by Provider: 04/30/18 20:56





- Personal History


Current Tetanus Diphtheria and Acellular Pertussis (TDAP): Yes





- Medical/Surgical History


Hx Asthma: No


Hx Chronic Respiratory Disease: No


Hx Diabetes: No


Hx Cardiac Disease: Yes


Hx Renal Disease: No


Hx Cirrhosis: No


Hx Alcoholism: No


Hx HIV/AIDS: No


Hx Splenectomy or Spleen Trauma: No


Other PMH: coarctation of aorta patched in 1970, aortic root replacement, 

replaced ascending aorta, valve replacement, afib, Coccyx mass removal





- Social History


Smoking Status: Never smoked


Constitutional: 


 Initial Vital Signs











Temperature (C)  37.2 C   04/30/18 20:10


 


Heart Rate  118 H  04/30/18 20:10


 


Respiratory Rate  18   04/30/18 20:10


 


Blood Pressure  123/81 H  04/30/18 20:10


 


O2 Sat (%)  93   04/30/18 20:10








 











O2 Delivery Mode               Room Air














Allergies/Adverse Reactions: 


 





No Known Allergies Allergy (Verified 04/30/18 20:07)


 








Home Medications: 














 Medication  Instructions  Recorded


 


Aspirin [Aspirin 81mg (*)] 81 mg PO DAILY 06/14/10


 


Atorvastatin Calcium [Lipitor 20 20 mg PO HS 06/14/10





mg (*)]  


 


Acetaminophen [Tylenol 325mg (*)] 325 - 650 mg PO Q4HRS PRN #0 tab 03/01/17


 


Rivaroxaban [Xarelto 15mg (*)] 15 mg PO HS 04/26/17


 


Amiodarone HCl [Pacerone (*)] 200 mg PO DAILY 07/26/17


 


Metoprolol Tartrate [Lopressor 50 50 mg PO BID 07/26/17





mg (*)]  


 


Hydrocodone/APAP 5/325 [Norco 1 - 2 tab PO Q4HRS PRN #30 tab 08/09/17





5/325 (*)]  


 


Ibuprofen [Motrin (*)] 600 mg PO Q8HRS PRN #0 tab 08/09/17














Medical Decision Making


ED Course/Re-evaluation: 





CHIEF COMPLAINT:  Bleeding from pilonidal cyst removal site





HISTORY OF PRESENT ILLNESS: The patient is an anticoagulated 57 y/o male 

arriving with his wife complaining of bleeding from the site of a pilonidal 

cyst removal 4 months ago. He has been progressing well since this surgery, but 

tonight noticed bleeding from the site. He has no associated pain, fever, warmth

, or redness and denies systemic symptoms. No other complaints. 





REVIEW OF SYSTEMS:  





A 10 point review of systems was performed and is negative with the exception 

of the elements mentioned in the history of present illness.





PHYSICAL EXAM:  





HR, BP, O2 Sat, RR.  Temp noted


General Appearance:  Alert, well hydrated, appropriate, and non-toxic appearing.


Head:  Atraumatic without scalp tenderness or obvious injury


Eyes:  Pupils equal, round, reactive to light and accommodation, EOMI, no trauma

, no injection.


Nose:  Atraumatic, no rhinorrhea, clear.


Throat: Mucus membranes moist.


Neck:  Supple


Respiratory:  No retractions, no distress, no wheezes, and no accessory muscle 

use.  Lungs are clear to auscultation bilaterally.


Cardiovascular:  Regular rate and rhythm, no murmurs, rubs, or gallops. Good 

capillary refill all extremities.


Gastrointestinal:  Abdomen is soft, nontender, non-distended, no masses, no 

rebound, no guarding, no peritoneal signs.


Musculoskeletal:  Well-healing pilonidal cyst removal site with no purulence, 

no erythema, no warmth, mild bleeding from the site. 


Normal active ROM of all extremities, atraumatic.


Neurological:  Alert, appropriate, and interactive.  The patient has non-focal 

cranial nerves, motor, sensory, and cerebellar exam.


Skin:  No rashes, good turgor, no nodules on palpation.





Past medical history: Atrial fibrillation - Xarelto


Past surgical history: coarctation of aorta patched in 1970, aortic root 

replacement, replaced ascending aorta, valve replacement, pilonidal removal


Family history: Noncontributory


Social history: Wife at bedside. Surgeon: Dr. Hess.





DIFFERENTIAL DIAGNOSIS:   The differential diagnosis for the patient's symptoms 

included but was not limited to recurrence of pilonidal cyst, new pilonidal cyst

, skin laceration, skin abrasion.





MEDICAL DECISION MAKING:  


This is an anticoagulated 57 y/o male who presents with mild bleeding from the 

site of a pilonidal cyst removal. Bleeding is controlled on exam. No signs of 

local or systemic infection. Plan for packing and outpatient follow up with his 

surgeon for reevaluation in the next 1-2 days. He is comfortable with this 

plan. Return precautions discussed. 





Departure





- Departure


Disposition: Home, Routine, Self-Care


Clinical Impression: 


 Pilonidal cyst





Condition: Good


Instructions:  Pilonidal Cyst (ED), Pilonidal Cyst Excision (DC)


Additional Instructions: 


Follow up with Dr. Hess's office for re-evaluation and packing removal in 

the next 1-2 days. Return to the ED for worsening of condition. 


Referrals: 


Titi Faulkner MD [Primary Care Provider] - As per Instructions


Sandro Hess MD [Medical Doctor] - As per Instructions


Report Scribed for: Eyal Valverde


Report Scribed by: Morena Curtis


Date of Report: 04/30/18


Time of Report: 21:02

## 2018-10-27 ENCOUNTER — HOSPITAL ENCOUNTER (OUTPATIENT)
Dept: HOSPITAL 80 - FIMAGING | Age: 58
End: 2018-10-27
Attending: INTERNAL MEDICINE
Payer: COMMERCIAL

## 2018-10-27 DIAGNOSIS — I63.9: Primary | ICD-10-CM

## 2019-02-15 ENCOUNTER — HOSPITAL ENCOUNTER (OUTPATIENT)
Dept: HOSPITAL 80 - FCATH | Age: 59
Discharge: HOME | End: 2019-02-15
Attending: INTERNAL MEDICINE
Payer: COMMERCIAL

## 2019-02-15 DIAGNOSIS — Z87.74: ICD-10-CM

## 2019-02-15 DIAGNOSIS — Z95.2: ICD-10-CM

## 2019-02-15 DIAGNOSIS — I10: ICD-10-CM

## 2019-02-15 DIAGNOSIS — R51: ICD-10-CM

## 2019-02-15 DIAGNOSIS — H53.9: Primary | ICD-10-CM

## 2019-02-15 PROCEDURE — B245ZZ4 ULTRASONOGRAPHY OF LEFT HEART, TRANSESOPHAGEAL: ICD-10-PCS | Performed by: INTERNAL MEDICINE

## 2019-02-15 NOTE — ECHO
[email or web address]





[address] [address] 

Main: [phone] 



Fax: 



Transesophageal Echocardiography 

Name:            LILO GRAY                             MR#:

[id number]

Study Date:      02/15/2019                             Study Time:

08:24 AM

YOB: 1960                             Age:

58 year(s)

Height:            ( )                                  Weight:

( )

BSA:                                                    Gender:

Male

Examination:     RILEY                                    Indication:

bioprosthetic av

Image Quality:   Adequate                               Contrast: 

Requested by:     Merrill Jewell  

Heart Rate:                                             Rhythm: 

BP:                / 



Procedure Staff 

Ultrasound Technician:   Naya Tam [facility] 

Reading Physician:       Merrill Jewell MD 

Requesting Provider: 



RILEY Exam Details 



Conclusions:          Normal size left ventricle.  

Normal global systolic LV function.  

No regional wall motion abnormality.  

The left atrium is normal in size.  

An agitated saline study was performed and was negative at rest and

positive with Valsalva

maneuver.  

The appendage appears to be sewn over.  

Mild mitral valve regurgitation is present.  

Normal functioning aortic valve prosthesis.  

Trivial tricuspid valve regurgitation.  

No cardiac source for embolism. 







Measurements: 

Chambers                   Valvular Assessment AV/MV          Valvular

Assessment TV/PV



Normal                                Normal

Normal

Name         Value   Range             Name        Value Range

Name          Value Range



Additional Measurements: 



Findings:             Left Ventricle: 

Normal size left ventricle. No LV hypertrophy. Normal global systolic

LV function. No regional wall

motion abnormality.  



Patient: LILO GRAY                         MRN: [id number]

Study Date: 02/15/2019   Page 1 of 2

08:24 AM 









Right Ventricle: 

Normal size right ventricle.  

Left Atrium: 

The left atrium is normal in size. An agitated saline study was

performed and was negative at rest

and positive with Valsalva maneuver.  

Left Atrial Appendage: 

The appendage appears to be sewn over.  

Right Atrium: 

The right atrium is normal in size.  

Mitral Valve: 

The mitral valve is normal in appearance and function. Mild mitral

valve regurgitation is present. No

mitral stenosis is present.  

Aortic Valve: 

The aortic valve is a bioprosthesis. Normal functioning aortic valve

prosthesis. The prosthetic aortic

valve is normal. The orifice motion of the prosthetic aortic valve is

normal. No prosthesis

regurgitation.  

Tricuspid Valve: 

The tricuspid valve is normal in appearance and function. Trivial

tricuspid valve regurgitation.

Pulmonic Valve: 

Pulmonary valve not well visualized.  

Aorta: 

Atheroma noted. 



l1n 



Electronically signed by Merrill Jewell MD on 02/15/2019 at 02:32 PM 

(No Signature Object) 



Patient: LILO GRAY                         MRN: [id number]

Study Date: 02/15/2019   Page 2 of 2

08:24 AM 







[id number]